# Patient Record
Sex: FEMALE | Race: WHITE | NOT HISPANIC OR LATINO | URBAN - METROPOLITAN AREA
[De-identification: names, ages, dates, MRNs, and addresses within clinical notes are randomized per-mention and may not be internally consistent; named-entity substitution may affect disease eponyms.]

---

## 2017-03-07 ENCOUNTER — OUTPATIENT (OUTPATIENT)
Dept: OUTPATIENT SERVICES | Facility: HOSPITAL | Age: 27
LOS: 1 days | End: 2017-03-07
Payer: COMMERCIAL

## 2017-03-07 ENCOUNTER — APPOINTMENT (OUTPATIENT)
Dept: CT IMAGING | Facility: IMAGING CENTER | Age: 27
End: 2017-03-07

## 2017-03-07 DIAGNOSIS — Z98.89 OTHER SPECIFIED POSTPROCEDURAL STATES: Chronic | ICD-10-CM

## 2017-03-07 DIAGNOSIS — Z00.8 ENCOUNTER FOR OTHER GENERAL EXAMINATION: ICD-10-CM

## 2017-03-07 PROCEDURE — 74177 CT ABD & PELVIS W/CONTRAST: CPT

## 2017-03-20 ENCOUNTER — APPOINTMENT (OUTPATIENT)
Dept: THORACIC SURGERY | Facility: CLINIC | Age: 27
End: 2017-03-20

## 2017-03-22 ENCOUNTER — APPOINTMENT (OUTPATIENT)
Dept: THORACIC SURGERY | Facility: CLINIC | Age: 27
End: 2017-03-22

## 2017-03-22 VITALS
DIASTOLIC BLOOD PRESSURE: 62 MMHG | HEART RATE: 79 BPM | WEIGHT: 124 LBS | OXYGEN SATURATION: 99 % | SYSTOLIC BLOOD PRESSURE: 100 MMHG | HEIGHT: 63 IN | BODY MASS INDEX: 21.97 KG/M2 | RESPIRATION RATE: 18 BRPM

## 2017-04-03 ENCOUNTER — APPOINTMENT (OUTPATIENT)
Dept: MRI IMAGING | Facility: CLINIC | Age: 27
End: 2017-04-03

## 2017-04-03 ENCOUNTER — OUTPATIENT (OUTPATIENT)
Dept: OUTPATIENT SERVICES | Facility: HOSPITAL | Age: 27
LOS: 1 days | End: 2017-04-03
Payer: COMMERCIAL

## 2017-04-03 DIAGNOSIS — Z00.8 ENCOUNTER FOR OTHER GENERAL EXAMINATION: ICD-10-CM

## 2017-04-03 DIAGNOSIS — M89.9 DISORDER OF BONE, UNSPECIFIED: ICD-10-CM

## 2017-04-03 DIAGNOSIS — Z98.89 OTHER SPECIFIED POSTPROCEDURAL STATES: Chronic | ICD-10-CM

## 2017-04-03 PROCEDURE — 71550 MRI CHEST W/O DYE: CPT

## 2017-04-06 ENCOUNTER — CLINICAL ADVICE (OUTPATIENT)
Age: 27
End: 2017-04-06

## 2017-04-27 ENCOUNTER — EMERGENCY (EMERGENCY)
Facility: HOSPITAL | Age: 27
LOS: 1 days | Discharge: HOME | End: 2017-04-27
Attending: EMERGENCY MEDICINE | Admitting: EMERGENCY MEDICINE
Payer: COMMERCIAL

## 2017-04-27 VITALS
SYSTOLIC BLOOD PRESSURE: 115 MMHG | HEART RATE: 85 BPM | TEMPERATURE: 99 F | DIASTOLIC BLOOD PRESSURE: 82 MMHG | RESPIRATION RATE: 16 BRPM | OXYGEN SATURATION: 100 %

## 2017-04-27 DIAGNOSIS — Z98.89 OTHER SPECIFIED POSTPROCEDURAL STATES: Chronic | ICD-10-CM

## 2017-04-27 PROCEDURE — 99284 EMERGENCY DEPT VISIT MOD MDM: CPT | Mod: 25

## 2017-04-27 PROCEDURE — 93010 ELECTROCARDIOGRAM REPORT: CPT

## 2017-04-27 NOTE — ED ADULT TRIAGE NOTE - CHIEF COMPLAINT QUOTE
Pt arrives w/ c/o chest pain accompanied w/ SOB and dry cough since Sunday. Respirations appear even and unlabored.

## 2017-04-28 VITALS
SYSTOLIC BLOOD PRESSURE: 111 MMHG | RESPIRATION RATE: 16 BRPM | HEART RATE: 74 BPM | TEMPERATURE: 99 F | DIASTOLIC BLOOD PRESSURE: 79 MMHG | OXYGEN SATURATION: 100 %

## 2017-04-28 RX ORDER — ALBUTEROL 90 UG/1
1 AEROSOL, METERED ORAL ONCE
Qty: 0 | Refills: 0 | Status: COMPLETED | OUTPATIENT
Start: 2017-04-28 | End: 2017-04-28

## 2017-04-28 RX ORDER — FLUTICASONE PROPIONATE 50 MCG
1 SPRAY, SUSPENSION NASAL
Qty: 1 | Refills: 0
Start: 2017-04-28 | End: 2017-05-13

## 2017-04-28 RX ORDER — ALBUTEROL 90 UG/1
2.5 AEROSOL, METERED ORAL ONCE
Qty: 0 | Refills: 0 | Status: COMPLETED | OUTPATIENT
Start: 2017-04-28 | End: 2017-04-28

## 2017-04-28 RX ADMIN — ALBUTEROL 1 PUFF(S): 90 AEROSOL, METERED ORAL at 03:38

## 2017-04-28 RX ADMIN — ALBUTEROL 2.5 MILLIGRAM(S): 90 AEROSOL, METERED ORAL at 03:16

## 2017-04-28 NOTE — ED PROVIDER NOTE - OBJECTIVE STATEMENT
26F no PMH p/w cough, nasal congestion, SOB x 5 days. Taking mucinex and ibuprofen without relief. + chills 2 days ago. Denies sick contacts, recent travel, fever, nausea, vomiting, diarrhea, dysuria, hematuria. LMP first week of april.

## 2017-04-28 NOTE — ED PROVIDER NOTE - PLAN OF CARE
- Follow up with your primary care doctor within 2-3 days.   - Take flonase 1 spray each nostril once a day. Use saline spray as needed.   - Use albuterol 2 puffs every 4-6 hours as needed for shortness of breath.   - Use tessalon perles every 6 hours as needed for cough.   - Return to the ED for new or worsening symptoms.

## 2017-04-28 NOTE — ED PROVIDER NOTE - CARE PLAN
Principal Discharge DX:	URI (upper respiratory infection)  Instructions for follow-up, activity and diet:	- Follow up with your primary care doctor within 2-3 days.   - Take flonase 1 spray each nostril once a day. Use saline spray as needed.   - Use albuterol 2 puffs every 4-6 hours as needed for shortness of breath.   - Use tessalon perles every 6 hours as needed for cough.   - Return to the ED for new or worsening symptoms.

## 2017-04-28 NOTE — ED PROVIDER NOTE - ATTENDING CONTRIBUTION TO CARE
AJM: 26F no PMH p/w cough, nasal congestion, SOB x 5 days. Taking mucinex and ibuprofen without relief. + chills 2 days ago. VSS. benign resp exam. No smoking. No resp distress. likely viral uri vs allergic. [facility]vs, flonase

## 2017-04-28 NOTE — ED ADULT NURSE NOTE - OBJECTIVE STATEMENT
RN Facilitator: Pt received into room 19 co CP, SOB and dry cough since Sunday, worsening today. Pt states CP is 8/10 describes pain as pressure, pain is worse when coughing, SOB is on exertion and worse with coughing. Pt A&Ox4, in NAD. Pt also co chills, generalized fatigue. Pt denies fevers, urinary symptoms, dizzy, light headed. Pt also states she has been smoking cigarettes increasingly lately; pt has been taking ibuprofen daily with no relief of pain. MD evaluated, Vs as noted. Call bell within reach, will continue to monitor for safety and comfort. Report endorsed to Primary RN Corinne.

## 2017-08-29 ENCOUNTER — RESULT REVIEW (OUTPATIENT)
Age: 27
End: 2017-08-29

## 2017-11-28 ENCOUNTER — APPOINTMENT (OUTPATIENT)
Dept: OTOLARYNGOLOGY | Facility: CLINIC | Age: 27
End: 2017-11-28
Payer: COMMERCIAL

## 2017-11-28 VITALS
HEART RATE: 81 BPM | DIASTOLIC BLOOD PRESSURE: 69 MMHG | HEIGHT: 63 IN | WEIGHT: 124 LBS | BODY MASS INDEX: 21.97 KG/M2 | SYSTOLIC BLOOD PRESSURE: 106 MMHG

## 2017-11-28 PROCEDURE — 31231 NASAL ENDOSCOPY DX: CPT

## 2017-11-28 PROCEDURE — 99214 OFFICE O/P EST MOD 30 MIN: CPT | Mod: 25

## 2018-02-16 ENCOUNTER — APPOINTMENT (OUTPATIENT)
Dept: UROLOGY | Facility: CLINIC | Age: 28
End: 2018-02-16
Payer: COMMERCIAL

## 2018-02-16 VITALS
RESPIRATION RATE: 16 BRPM | BODY MASS INDEX: 21.97 KG/M2 | WEIGHT: 124 LBS | HEART RATE: 75 BPM | HEIGHT: 63 IN | SYSTOLIC BLOOD PRESSURE: 90 MMHG | DIASTOLIC BLOOD PRESSURE: 63 MMHG

## 2018-02-16 DIAGNOSIS — Z84.1 FAMILY HISTORY OF DISORDERS OF KIDNEY AND URETER: ICD-10-CM

## 2018-02-16 DIAGNOSIS — Z87.19 PERSONAL HISTORY OF OTHER DISEASES OF THE DIGESTIVE SYSTEM: ICD-10-CM

## 2018-02-16 DIAGNOSIS — Z78.9 OTHER SPECIFIED HEALTH STATUS: ICD-10-CM

## 2018-02-16 DIAGNOSIS — Z86.19 PERSONAL HISTORY OF OTHER INFECTIOUS AND PARASITIC DISEASES: ICD-10-CM

## 2018-02-16 PROCEDURE — 51798 US URINE CAPACITY MEASURE: CPT

## 2018-02-16 PROCEDURE — 99205 OFFICE O/P NEW HI 60 MIN: CPT

## 2018-02-19 ENCOUNTER — TRANSCRIPTION ENCOUNTER (OUTPATIENT)
Age: 28
End: 2018-02-19

## 2018-02-23 LAB
A VAGINAE DNA VAG QL NAA+PROBE: NORMAL
APPEARANCE: CLEAR
BACTERIA UR CULT: ABNORMAL
BACTERIA: NEGATIVE
BILIRUBIN URINE: NEGATIVE
BLOOD URINE: NEGATIVE
BVAB2 DNA VAG QL NAA+PROBE: NORMAL
C KRUSEI DNA VAG QL NAA+PROBE: NEGATIVE
COLOR: YELLOW
GLUCOSE QUALITATIVE U: NEGATIVE MG/DL
HYALINE CASTS: 1 /LPF
KETONES URINE: NEGATIVE
LEUKOCYTE ESTERASE URINE: NEGATIVE
MEGA1 DNA VAG QL NAA+PROBE: NORMAL
MICROSCOPIC-UA: NORMAL
NITRITE URINE: NEGATIVE
PH URINE: 7
PROTEIN URINE: NEGATIVE MG/DL
RED BLOOD CELLS URINE: 1 /HPF
SPECIFIC GRAVITY URINE: 1.02
SQUAMOUS EPITHELIAL CELLS: 4 /HPF
T VAGINALIS RRNA SPEC QL NAA+PROBE: NEGATIVE
UROBILINOGEN URINE: NEGATIVE MG/DL
WHITE BLOOD CELLS URINE: 0 /HPF

## 2018-02-26 ENCOUNTER — MESSAGE (OUTPATIENT)
Age: 28
End: 2018-02-26

## 2018-04-11 ENCOUNTER — APPOINTMENT (OUTPATIENT)
Dept: UROLOGY | Facility: CLINIC | Age: 28
End: 2018-04-11
Payer: COMMERCIAL

## 2018-04-11 PROCEDURE — 99215 OFFICE O/P EST HI 40 MIN: CPT

## 2018-06-13 ENCOUNTER — APPOINTMENT (OUTPATIENT)
Dept: UROLOGY | Facility: CLINIC | Age: 28
End: 2018-06-13
Payer: COMMERCIAL

## 2018-06-13 PROCEDURE — 99214 OFFICE O/P EST MOD 30 MIN: CPT

## 2018-07-07 ENCOUNTER — MESSAGE (OUTPATIENT)
Age: 28
End: 2018-07-07

## 2018-07-09 LAB
A VAGINAE DNA VAG QL NAA+PROBE: NORMAL
BACTERIA UR CULT: ABNORMAL
BVAB2 DNA VAG QL NAA+PROBE: NORMAL
C KRUSEI DNA VAG QL NAA+PROBE: NEGATIVE
MEGA1 DNA VAG QL NAA+PROBE: NORMAL
T VAGINALIS RRNA SPEC QL NAA+PROBE: NEGATIVE

## 2018-09-05 ENCOUNTER — RESULT REVIEW (OUTPATIENT)
Age: 28
End: 2018-09-05

## 2018-09-12 ENCOUNTER — APPOINTMENT (OUTPATIENT)
Dept: UROLOGY | Facility: CLINIC | Age: 28
End: 2018-09-12

## 2018-09-17 ENCOUNTER — RESULT REVIEW (OUTPATIENT)
Age: 28
End: 2018-09-17

## 2018-10-24 ENCOUNTER — APPOINTMENT (OUTPATIENT)
Dept: UROLOGY | Facility: CLINIC | Age: 28
End: 2018-10-24
Payer: COMMERCIAL

## 2018-10-24 PROCEDURE — 99214 OFFICE O/P EST MOD 30 MIN: CPT

## 2018-10-29 ENCOUNTER — RESULT REVIEW (OUTPATIENT)
Age: 28
End: 2018-10-29

## 2018-10-29 LAB — BACTERIA UR CULT: ABNORMAL

## 2018-10-31 LAB
A VAGINAE DNA VAG QL NAA+PROBE: NORMAL
BVAB2 DNA VAG QL NAA+PROBE: NORMAL
C KRUSEI DNA VAG QL NAA+PROBE: NEGATIVE
MEGA1 DNA VAG QL NAA+PROBE: NORMAL
T VAGINALIS RRNA SPEC QL NAA+PROBE: NEGATIVE

## 2018-11-28 LAB
APPEARANCE: CLEAR
BACTERIA UR CULT: NORMAL
BACTERIA: NEGATIVE
BILIRUBIN URINE: NEGATIVE
BLOOD URINE: NEGATIVE
CALCIUM OXALATE CRYSTALS: ABNORMAL
COLOR: ABNORMAL
GLUCOSE QUALITATIVE U: NEGATIVE MG/DL
HYALINE CASTS: 2 /LPF
KETONES URINE: ABNORMAL
LEUKOCYTE ESTERASE URINE: NEGATIVE
MICROSCOPIC-UA: NORMAL
NITRITE URINE: NEGATIVE
PH URINE: 6
PROTEIN URINE: ABNORMAL MG/DL
RED BLOOD CELLS URINE: 2 /HPF
SPECIFIC GRAVITY URINE: 1.02
SQUAMOUS EPITHELIAL CELLS: 4 /HPF
URINE COMMENTS: NORMAL
UROBILINOGEN URINE: NEGATIVE MG/DL
WHITE BLOOD CELLS URINE: 3 /HPF

## 2018-12-19 ENCOUNTER — APPOINTMENT (OUTPATIENT)
Dept: UROLOGY | Facility: CLINIC | Age: 28
End: 2018-12-19

## 2019-03-11 ENCOUNTER — RESULT REVIEW (OUTPATIENT)
Age: 29
End: 2019-03-11

## 2019-03-27 ENCOUNTER — RESULT REVIEW (OUTPATIENT)
Age: 29
End: 2019-03-27

## 2019-04-26 ENCOUNTER — APPOINTMENT (OUTPATIENT)
Dept: UROLOGY | Facility: CLINIC | Age: 29
End: 2019-04-26
Payer: COMMERCIAL

## 2019-04-26 ENCOUNTER — APPOINTMENT (OUTPATIENT)
Dept: UROLOGY | Facility: CLINIC | Age: 29
End: 2019-04-26

## 2019-04-26 ENCOUNTER — TRANSCRIPTION ENCOUNTER (OUTPATIENT)
Age: 29
End: 2019-04-26

## 2019-04-26 VITALS
OXYGEN SATURATION: 97 % | BODY MASS INDEX: 21.97 KG/M2 | HEART RATE: 84 BPM | DIASTOLIC BLOOD PRESSURE: 70 MMHG | WEIGHT: 124 LBS | RESPIRATION RATE: 16 BRPM | SYSTOLIC BLOOD PRESSURE: 100 MMHG | HEIGHT: 63 IN

## 2019-04-26 DIAGNOSIS — Z87.440 PERSONAL HISTORY OF URINARY (TRACT) INFECTIONS: ICD-10-CM

## 2019-04-26 PROCEDURE — 87210 SMEAR WET MOUNT SALINE/INK: CPT | Mod: QW

## 2019-04-26 PROCEDURE — 99215 OFFICE O/P EST HI 40 MIN: CPT | Mod: 25

## 2019-04-26 PROCEDURE — 83986 ASSAY PH BODY FLUID NOS: CPT | Mod: QW

## 2019-04-26 RX ORDER — FLUCONAZOLE 150 MG/1
150 TABLET ORAL
Qty: 1 | Refills: 3 | Status: COMPLETED | COMMUNITY
Start: 2018-07-07 | End: 2019-04-26

## 2019-04-26 RX ORDER — DICYCLOMINE HYDROCHLORIDE 10 MG/1
10 CAPSULE ORAL
Qty: 30 | Refills: 0 | Status: COMPLETED | COMMUNITY
Start: 2017-03-09 | End: 2019-04-26

## 2019-04-26 RX ORDER — CLOTRIMAZOLE AND BETAMETHASONE DIPROPIONATE 10; .5 MG/G; MG/G
1-0.05 CREAM TOPICAL
Qty: 45 | Refills: 0 | Status: COMPLETED | COMMUNITY
Start: 2016-11-14 | End: 2019-04-26

## 2019-04-26 RX ORDER — DOXYCYCLINE HYCLATE 100 MG/1
100 CAPSULE ORAL
Qty: 20 | Refills: 0 | Status: COMPLETED | COMMUNITY
Start: 2017-02-24 | End: 2019-04-26

## 2019-04-26 RX ORDER — FLUCONAZOLE 150 MG/1
150 TABLET ORAL
Qty: 1 | Refills: 0 | Status: COMPLETED | COMMUNITY
Start: 2017-01-25 | End: 2019-04-26

## 2019-04-26 RX ORDER — FLUCONAZOLE 150 MG/1
150 TABLET ORAL
Qty: 1 | Refills: 2 | Status: COMPLETED | COMMUNITY
Start: 2018-05-31 | End: 2019-04-26

## 2019-04-26 RX ORDER — IBUPROFEN 600 MG/1
600 TABLET, FILM COATED ORAL 3 TIMES DAILY
Qty: 30 | Refills: 0 | Status: COMPLETED | COMMUNITY
Start: 2017-04-06 | End: 2019-04-26

## 2019-04-26 RX ORDER — NYSTATIN AND TRIAMCINOLONE ACETONIDE 100000; 1 [USP'U]/G; MG/G
100000-0.1 OINTMENT TOPICAL
Qty: 30 | Refills: 0 | Status: COMPLETED | COMMUNITY
Start: 2017-08-18 | End: 2019-04-26

## 2019-04-26 RX ORDER — METRONIDAZOLE 7.5 MG/G
0.75 GEL VAGINAL
Qty: 5 | Refills: 0 | Status: COMPLETED | COMMUNITY
Start: 2018-07-09 | End: 2019-04-26

## 2019-04-26 RX ORDER — AZITHROMYCIN DIHYDRATE 250 MG/1
250 TABLET, FILM COATED ORAL
Qty: 1 | Refills: 2 | Status: COMPLETED | COMMUNITY
Start: 2017-11-28 | End: 2019-04-26

## 2019-04-26 RX ORDER — SULFAMETHOXAZOLE AND TRIMETHOPRIM 800; 160 MG/1; MG/1
800-160 TABLET ORAL TWICE DAILY
Qty: 10 | Refills: 0 | Status: COMPLETED | COMMUNITY
Start: 2018-10-29 | End: 2019-04-26

## 2019-04-26 RX ORDER — CIPROFLOXACIN HYDROCHLORIDE 500 MG/1
500 TABLET, FILM COATED ORAL
Qty: 10 | Refills: 2 | Status: COMPLETED | COMMUNITY
Start: 2018-02-23 | End: 2019-04-26

## 2019-04-26 RX ORDER — TINIDAZOLE 500 MG/1
500 TABLET, FILM COATED ORAL
Qty: 8 | Refills: 0 | Status: COMPLETED | COMMUNITY
Start: 2017-11-07 | End: 2019-04-26

## 2019-04-26 RX ORDER — METRONIDAZOLE 500 MG/1
500 TABLET ORAL
Qty: 21 | Refills: 0 | Status: COMPLETED | COMMUNITY
Start: 2017-02-14 | End: 2019-04-26

## 2019-04-26 RX ORDER — BUTOCONAZOLE NITRATE 100 MG/5G
2 CREAM VAGINAL
Qty: 5 | Refills: 0 | Status: COMPLETED | COMMUNITY
Start: 2017-02-14 | End: 2019-04-26

## 2019-04-29 LAB
APPEARANCE: CLEAR
BACTERIA UR CULT: NORMAL
BACTERIA: NEGATIVE
BILIRUBIN URINE: NEGATIVE
BLOOD URINE: ABNORMAL
COLOR: NORMAL
GLUCOSE QUALITATIVE U: NEGATIVE
HYALINE CASTS: 0 /LPF
KETONES URINE: NORMAL
LEUKOCYTE ESTERASE URINE: NEGATIVE
MICROSCOPIC-UA: NORMAL
NITRITE URINE: NEGATIVE
PH URINE: 7
PROTEIN URINE: NEGATIVE
RED BLOOD CELLS URINE: 23 /HPF
SPECIFIC GRAVITY URINE: 1.02
SQUAMOUS EPITHELIAL CELLS: 4 /HPF
URINE COMMENTS: NORMAL
UROBILINOGEN URINE: NORMAL
WHITE BLOOD CELLS URINE: 2 /HPF

## 2019-05-01 ENCOUNTER — OUTPATIENT (OUTPATIENT)
Dept: OUTPATIENT SERVICES | Facility: HOSPITAL | Age: 29
LOS: 1 days | End: 2019-05-01
Payer: COMMERCIAL

## 2019-05-01 ENCOUNTER — APPOINTMENT (OUTPATIENT)
Dept: CT IMAGING | Facility: IMAGING CENTER | Age: 29
End: 2019-05-01
Payer: COMMERCIAL

## 2019-05-01 DIAGNOSIS — R31.29 OTHER MICROSCOPIC HEMATURIA: ICD-10-CM

## 2019-05-01 DIAGNOSIS — Z98.89 OTHER SPECIFIED POSTPROCEDURAL STATES: Chronic | ICD-10-CM

## 2019-05-01 PROCEDURE — 74178 CT ABD&PLV WO CNTR FLWD CNTR: CPT | Mod: 26

## 2019-05-01 PROCEDURE — 74178 CT ABD&PLV WO CNTR FLWD CNTR: CPT

## 2019-05-06 ENCOUNTER — APPOINTMENT (OUTPATIENT)
Dept: UROLOGY | Facility: CLINIC | Age: 29
End: 2019-05-06

## 2019-05-16 ENCOUNTER — EMERGENCY (EMERGENCY)
Facility: HOSPITAL | Age: 29
LOS: 1 days | Discharge: HOME | End: 2019-05-16
Attending: EMERGENCY MEDICINE | Admitting: EMERGENCY MEDICINE
Payer: COMMERCIAL

## 2019-05-16 VITALS
OXYGEN SATURATION: 100 % | HEART RATE: 80 BPM | TEMPERATURE: 100 F | RESPIRATION RATE: 16 BRPM | SYSTOLIC BLOOD PRESSURE: 100 MMHG | WEIGHT: 130.07 LBS | DIASTOLIC BLOOD PRESSURE: 57 MMHG | HEIGHT: 63 IN

## 2019-05-16 VITALS
TEMPERATURE: 99 F | DIASTOLIC BLOOD PRESSURE: 54 MMHG | RESPIRATION RATE: 16 BRPM | HEART RATE: 79 BPM | SYSTOLIC BLOOD PRESSURE: 112 MMHG | OXYGEN SATURATION: 100 %

## 2019-05-16 DIAGNOSIS — Z98.89 OTHER SPECIFIED POSTPROCEDURAL STATES: Chronic | ICD-10-CM

## 2019-05-16 LAB
ALBUMIN SERPL ELPH-MCNC: 4.6 G/DL — SIGNIFICANT CHANGE UP (ref 3.3–5)
ALP SERPL-CCNC: 71 U/L — SIGNIFICANT CHANGE UP (ref 40–120)
ALT FLD-CCNC: 10 U/L — SIGNIFICANT CHANGE UP (ref 4–33)
ANION GAP SERPL CALC-SCNC: 12 MMO/L — SIGNIFICANT CHANGE UP (ref 7–14)
APPEARANCE UR: CLEAR — SIGNIFICANT CHANGE UP
AST SERPL-CCNC: 14 U/L — SIGNIFICANT CHANGE UP (ref 4–32)
BACTERIA # UR AUTO: NEGATIVE — SIGNIFICANT CHANGE UP
BASOPHILS # BLD AUTO: 0.04 K/UL — SIGNIFICANT CHANGE UP (ref 0–0.2)
BASOPHILS NFR BLD AUTO: 0.6 % — SIGNIFICANT CHANGE UP (ref 0–2)
BILIRUB SERPL-MCNC: 0.5 MG/DL — SIGNIFICANT CHANGE UP (ref 0.2–1.2)
BILIRUB UR-MCNC: NEGATIVE — SIGNIFICANT CHANGE UP
BLOOD UR QL VISUAL: HIGH
BUN SERPL-MCNC: 16 MG/DL — SIGNIFICANT CHANGE UP (ref 7–23)
CALCIUM SERPL-MCNC: 9.3 MG/DL — SIGNIFICANT CHANGE UP (ref 8.4–10.5)
CHLORIDE SERPL-SCNC: 104 MMOL/L — SIGNIFICANT CHANGE UP (ref 98–107)
CO2 SERPL-SCNC: 25 MMOL/L — SIGNIFICANT CHANGE UP (ref 22–31)
COLOR SPEC: SIGNIFICANT CHANGE UP
CREAT SERPL-MCNC: 0.62 MG/DL — SIGNIFICANT CHANGE UP (ref 0.5–1.3)
EOSINOPHIL # BLD AUTO: 0.02 K/UL — SIGNIFICANT CHANGE UP (ref 0–0.5)
EOSINOPHIL NFR BLD AUTO: 0.3 % — SIGNIFICANT CHANGE UP (ref 0–6)
GLUCOSE SERPL-MCNC: 78 MG/DL — SIGNIFICANT CHANGE UP (ref 70–99)
GLUCOSE UR-MCNC: NEGATIVE — SIGNIFICANT CHANGE UP
HCT VFR BLD CALC: 36.3 % — SIGNIFICANT CHANGE UP (ref 34.5–45)
HGB BLD-MCNC: 11.7 G/DL — SIGNIFICANT CHANGE UP (ref 11.5–15.5)
HYALINE CASTS # UR AUTO: NEGATIVE — SIGNIFICANT CHANGE UP
IMM GRANULOCYTES NFR BLD AUTO: 0.3 % — SIGNIFICANT CHANGE UP (ref 0–1.5)
KETONES UR-MCNC: NEGATIVE — SIGNIFICANT CHANGE UP
LEUKOCYTE ESTERASE UR-ACNC: NEGATIVE — SIGNIFICANT CHANGE UP
LIDOCAIN IGE QN: 21.2 U/L — SIGNIFICANT CHANGE UP (ref 7–60)
LYMPHOCYTES # BLD AUTO: 1.85 K/UL — SIGNIFICANT CHANGE UP (ref 1–3.3)
LYMPHOCYTES # BLD AUTO: 26.8 % — SIGNIFICANT CHANGE UP (ref 13–44)
MCHC RBC-ENTMCNC: 29.2 PG — SIGNIFICANT CHANGE UP (ref 27–34)
MCHC RBC-ENTMCNC: 32.2 % — SIGNIFICANT CHANGE UP (ref 32–36)
MCV RBC AUTO: 90.5 FL — SIGNIFICANT CHANGE UP (ref 80–100)
MONOCYTES # BLD AUTO: 0.32 K/UL — SIGNIFICANT CHANGE UP (ref 0–0.9)
MONOCYTES NFR BLD AUTO: 4.6 % — SIGNIFICANT CHANGE UP (ref 2–14)
NEUTROPHILS # BLD AUTO: 4.65 K/UL — SIGNIFICANT CHANGE UP (ref 1.8–7.4)
NEUTROPHILS NFR BLD AUTO: 67.4 % — SIGNIFICANT CHANGE UP (ref 43–77)
NITRITE UR-MCNC: NEGATIVE — SIGNIFICANT CHANGE UP
NRBC # FLD: 0.02 K/UL — SIGNIFICANT CHANGE UP (ref 0–0)
PH UR: 6.5 — SIGNIFICANT CHANGE UP (ref 5–8)
PLATELET # BLD AUTO: 234 K/UL — SIGNIFICANT CHANGE UP (ref 150–400)
PMV BLD: 12.1 FL — SIGNIFICANT CHANGE UP (ref 7–13)
POTASSIUM SERPL-MCNC: 3.9 MMOL/L — SIGNIFICANT CHANGE UP (ref 3.5–5.3)
POTASSIUM SERPL-SCNC: 3.9 MMOL/L — SIGNIFICANT CHANGE UP (ref 3.5–5.3)
PROT SERPL-MCNC: 6.9 G/DL — SIGNIFICANT CHANGE UP (ref 6–8.3)
PROT UR-MCNC: NEGATIVE — SIGNIFICANT CHANGE UP
RBC # BLD: 4.01 M/UL — SIGNIFICANT CHANGE UP (ref 3.8–5.2)
RBC # FLD: 11.5 % — SIGNIFICANT CHANGE UP (ref 10.3–14.5)
RBC CASTS # UR COMP ASSIST: SIGNIFICANT CHANGE UP (ref 0–?)
SODIUM SERPL-SCNC: 141 MMOL/L — SIGNIFICANT CHANGE UP (ref 135–145)
SP GR SPEC: 1.01 — SIGNIFICANT CHANGE UP (ref 1–1.04)
SQUAMOUS # UR AUTO: SIGNIFICANT CHANGE UP
UROBILINOGEN FLD QL: NORMAL — SIGNIFICANT CHANGE UP
WBC # BLD: 6.9 K/UL — SIGNIFICANT CHANGE UP (ref 3.8–10.5)
WBC # FLD AUTO: 6.9 K/UL — SIGNIFICANT CHANGE UP (ref 3.8–10.5)
WBC UR QL: SIGNIFICANT CHANGE UP (ref 0–?)

## 2019-05-16 PROCEDURE — 99284 EMERGENCY DEPT VISIT MOD MDM: CPT

## 2019-05-16 PROCEDURE — 76830 TRANSVAGINAL US NON-OB: CPT | Mod: 26

## 2019-05-16 PROCEDURE — 76770 US EXAM ABDO BACK WALL COMP: CPT | Mod: 26

## 2019-05-16 RX ORDER — KETOROLAC TROMETHAMINE 30 MG/ML
15 SYRINGE (ML) INJECTION ONCE
Refills: 0 | Status: DISCONTINUED | OUTPATIENT
Start: 2019-05-16 | End: 2019-05-16

## 2019-05-16 RX ORDER — ONDANSETRON 8 MG/1
4 TABLET, FILM COATED ORAL ONCE
Refills: 0 | Status: COMPLETED | OUTPATIENT
Start: 2019-05-16 | End: 2019-05-16

## 2019-05-16 RX ORDER — SODIUM CHLORIDE 9 MG/ML
1000 INJECTION INTRAMUSCULAR; INTRAVENOUS; SUBCUTANEOUS ONCE
Refills: 0 | Status: COMPLETED | OUTPATIENT
Start: 2019-05-16 | End: 2019-05-16

## 2019-05-16 RX ADMIN — Medication 15 MILLIGRAM(S): at 18:37

## 2019-05-16 RX ADMIN — ONDANSETRON 4 MILLIGRAM(S): 8 TABLET, FILM COATED ORAL at 18:37

## 2019-05-16 RX ADMIN — SODIUM CHLORIDE 2000 MILLILITER(S): 9 INJECTION INTRAMUSCULAR; INTRAVENOUS; SUBCUTANEOUS at 18:37

## 2019-05-16 NOTE — ED PROVIDER NOTE - PROGRESS NOTE DETAILS
pt feels well, notes mild improvement w/ meds.  Offered further pain management, would prefer to go home.  Has rx leftover from prior kidney stone, does not need refills.  to f/u w/ uro tomorrow.  stable for NC.

## 2019-05-16 NOTE — ED PROVIDER NOTE - PHYSICAL EXAMINATION
***GEN - NAD; well appearing; A+O x3   ***PULMONARY - CTA b/l, symmetric breath sounds. ***CARDIAC -s1s2, RRR, no M,G,R  ***ABDOMEN - ND, NT, soft, no guarding, no rebound, no roula's   ***SKIN - no rash or bruising   ***NEUROLOGIC - alert and oriented, follows commands, sensation nl, motor nl, ***PSYCH - insight and judgment nl, memory nl, affect nl, thought nl    neg CVAt ***GEN - NAD; well appearing; A+O x3   ***PULMONARY - CTA b/l, symmetric breath sounds. ***CARDIAC -s1s2, RRR, no M,G,R  ***ABDOMEN - ND, NT, soft, no guarding, no rebound, no roula's   ***SKIN - no rash or bruising   ***NEUROLOGIC - alert and oriented, follows commands, sensation nl, motor nl, ***PSYCH - insight and judgment nl, memory nl, affect nl, thought nl    neg CVAt  no midline spinal TTP

## 2019-05-16 NOTE — ED ADULT TRIAGE NOTE - CHIEF COMPLAINT QUOTE
Left abdominal pain radiating to the left flank x 2 weeks, pt was here 2 weeks ago dx with a 3mm stone, pt passed stone last Friday but still c/o pain

## 2019-05-16 NOTE — ED PROVIDER NOTE - OBJECTIVE STATEMENT
29 y/o F w/ Hx renal stone pw L flank pain x 2 days.  recent dx of kidney stone outpatient w/ L flank pain, similar associated w/ urinary urgency.  Pt passed recent stone w/ resolution of symptoms.  L flank pain returned yesterday w/o urgency.  No AP, CP, SOB, fever, dysuria, vaginal pain/bleeding/discharge, d/c.  +nausea/-vomit. 29 y/o F w/ Hx renal stone pw L flank pain x 2 days.  Recent dx of kidney stone outpatient w/ L flank pain, persistent since onset - associated w/ urinary urgency and bladder pressure.  Pt passed recent stone w/ resolution of symptoms urgency and bladder pressure.  Has had persistent L flank pain x 3 wks, was intermittent, noted constant since yesterday.  No AP, CP, SOB, fever, dysuria, vaginal pain/bleeding/discharge, d/c, recent trauma, LE numbness/weakness, incontinence.  +nausea/-vomit.

## 2019-05-16 NOTE — ED PROVIDER NOTE - CLINICAL SUMMARY MEDICAL DECISION MAKING FREE TEXT BOX
pt w/ flank pain, consistent w/ prior renal stone.  Will check US, labs/UA.  Is well appearing. pt w/ flank pain, consistent w/ prior renal stone, persistent since kidney stone.  s/p recent CT scan 5/1 only showing renal stone.  Will check US, labs/UA.  Is well appearing.

## 2019-05-18 LAB
BACTERIA UR CULT: SIGNIFICANT CHANGE UP
SPECIMEN SOURCE: SIGNIFICANT CHANGE UP

## 2019-05-19 NOTE — ED POST DISCHARGE NOTE - RESULT SUMMARY
culture grew 3 or more types of organisms  which indicate collection contamination, consider recollection only if clinically indicated. Patient contact # [phone] discussed with patient ucx results. Discussed with patient need to return to ED if symptoms don't continue to improve or recur or develops any new or worsening symptoms that are of concern.

## 2019-05-29 ENCOUNTER — RX RENEWAL (OUTPATIENT)
Age: 29
End: 2019-05-29

## 2019-06-03 LAB
APPEARANCE: CLEAR
BACTERIA UR CULT: NORMAL
BACTERIA: NEGATIVE
BILIRUBIN URINE: NEGATIVE
BLOOD URINE: NEGATIVE
COLOR: YELLOW
GLUCOSE QUALITATIVE U: NEGATIVE
HYALINE CASTS: 2 /LPF
KETONES URINE: NEGATIVE
LEUKOCYTE ESTERASE URINE: NEGATIVE
MICROSCOPIC-UA: NORMAL
NITRITE URINE: NEGATIVE
PH URINE: 6
PROTEIN URINE: NORMAL
RED BLOOD CELLS URINE: 6 /HPF
SPECIFIC GRAVITY URINE: 1.03
SQUAMOUS EPITHELIAL CELLS: 5 /HPF
URINE COMMENTS: NORMAL
UROBILINOGEN URINE: NORMAL
WHITE BLOOD CELLS URINE: 3 /HPF

## 2019-06-07 ENCOUNTER — APPOINTMENT (OUTPATIENT)
Dept: UROLOGY | Facility: CLINIC | Age: 29
End: 2019-06-07
Payer: COMMERCIAL

## 2019-06-07 VITALS
RESPIRATION RATE: 13 BRPM | BODY MASS INDEX: 21.97 KG/M2 | HEART RATE: 88 BPM | WEIGHT: 124 LBS | OXYGEN SATURATION: 99 % | SYSTOLIC BLOOD PRESSURE: 108 MMHG | HEIGHT: 63 IN | DIASTOLIC BLOOD PRESSURE: 60 MMHG

## 2019-06-07 PROCEDURE — 52000 CYSTOURETHROSCOPY: CPT

## 2019-06-07 PROCEDURE — 99213 OFFICE O/P EST LOW 20 MIN: CPT | Mod: 25

## 2019-06-07 NOTE — HISTORY OF PRESENT ILLNESS
[FreeTextEntry1] : She is a 28-year-old woman who is seen today for microscopic hematuria and kidney stones. She is undergoing cystoscopy today. Recently she passed a stone. She has a strong family history of kidney stones. Urinalysis showed red blood cells and urine culture was negative. 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 She has the stone with her today. She still has lower back pain or discomfort. She also has previous history of herniated disc.

## 2019-06-07 NOTE — PHYSICAL EXAM
[General Appearance - Well Nourished] : well nourished [General Appearance - Well Developed] : well developed [Normal Appearance] : normal appearance [Abdomen Tenderness] : non-tender [Well Groomed] : well groomed [General Appearance - In No Acute Distress] : no acute distress [Abdomen Soft] : soft [Costovertebral Angle Tenderness] : no ~M costovertebral angle tenderness [FreeTextEntry1] : no prolapse [] : no respiratory distress [Respiration, Rhythm And Depth] : normal respiratory rhythm and effort [Exaggerated Use Of Accessory Muscles For Inspiration] : no accessory muscle use [Oriented To Time, Place, And Person] : oriented to person, place, and time [Mood] : the mood was normal [Affect] : the affect was normal [Not Anxious] : not anxious

## 2019-06-07 NOTE — ASSESSMENT
[FreeTextEntry1] : Cystoscopy was normal. We reviewed her CT scan images on the computer screen. Stone will be sent for analysis. Due to history of kidney stones, renal ultrasound should be done once a year. Also she will undergo 24-hour urine collection for metabolic workup. She could call me for the results. Stay hydrated. After metabolic workup, we could more accurately discuss dietary changes.

## 2019-06-11 LAB — COMPN STONE: NORMAL

## 2019-06-26 ENCOUNTER — APPOINTMENT (OUTPATIENT)
Dept: UROLOGY | Facility: CLINIC | Age: 29
End: 2019-06-26

## 2019-09-16 ENCOUNTER — RESULT REVIEW (OUTPATIENT)
Age: 29
End: 2019-09-16

## 2020-07-23 ENCOUNTER — APPOINTMENT (OUTPATIENT)
Dept: UROLOGY | Facility: CLINIC | Age: 30
End: 2020-07-23
Payer: COMMERCIAL

## 2020-07-23 VITALS — DIASTOLIC BLOOD PRESSURE: 70 MMHG | TEMPERATURE: 98.2 F | SYSTOLIC BLOOD PRESSURE: 108 MMHG

## 2020-07-23 DIAGNOSIS — N76.0 ACUTE VAGINITIS: ICD-10-CM

## 2020-07-23 DIAGNOSIS — B96.89 ACUTE VAGINITIS: ICD-10-CM

## 2020-07-23 PROCEDURE — 87210 SMEAR WET MOUNT SALINE/INK: CPT | Mod: QW

## 2020-07-23 PROCEDURE — 83986 ASSAY PH BODY FLUID NOS: CPT | Mod: QW

## 2020-07-23 PROCEDURE — 99214 OFFICE O/P EST MOD 30 MIN: CPT | Mod: 25

## 2020-07-23 RX ORDER — FLUCONAZOLE 200 MG/1
200 TABLET ORAL
Qty: 3 | Refills: 0 | Status: COMPLETED | COMMUNITY
Start: 2019-04-26 | End: 2020-07-23

## 2020-07-23 RX ORDER — CLINDAMYCIN PHOSPHATE 1 G/10ML
1 GEL TOPICAL TWICE DAILY
Qty: 1 | Refills: 2 | Status: COMPLETED | COMMUNITY
Start: 2018-04-11 | End: 2020-07-23

## 2020-07-23 RX ORDER — CLINDAMYCIN PHOSPHATE 100 MG/5G
2 CREAM VAGINAL DAILY
Qty: 1 | Refills: 0 | Status: COMPLETED | COMMUNITY
Start: 2019-04-26 | End: 2020-07-23

## 2020-08-25 RX ORDER — SECNIDAZOLE 2 G/4.8G
2 GRANULE ORAL
Qty: 1 | Refills: 0 | Status: COMPLETED | COMMUNITY
Start: 2020-07-23 | End: 2020-08-25

## 2020-10-02 ENCOUNTER — RESULT REVIEW (OUTPATIENT)
Age: 30
End: 2020-10-02

## 2020-12-16 PROBLEM — Z86.19 HISTORY OF CANDIDIASIS OF VAGINA: Status: RESOLVED | Noted: 2018-02-16 | Resolved: 2020-12-16

## 2020-12-21 PROBLEM — Z87.440 HISTORY OF URINARY TRACT INFECTION: Status: RESOLVED | Noted: 2018-02-23 | Resolved: 2020-12-21

## 2020-12-23 PROBLEM — N76.0 BACTERIAL VAGINOSIS: Status: RESOLVED | Noted: 2019-04-26 | Resolved: 2020-12-23

## 2020-12-29 NOTE — ED ADULT TRIAGE NOTE - TEMPERATURE IN CELSIUS (DEGREES C)
Case Management    PC to patient's mother, Ellyn [phone] - writer scheduled follow-up discharge meeting for tomorrow (12/30) at 12PM. Writer informed mother of change in medication appt to next week on Wednesday (1/6) at 11AM. Mother provided verbal consent for referral to [facility] for individual therapy. Mother is okay with CPS SW, Meka being a part of the meeting tomorrow.     PC to [facility] CPS, Meka [phone] - writer informed her of meeting tomorrow (12/30) at 12PM. SW confirmed that she is available at this time. Writer provided update in terms of discharge planning - med appt, individual therapy, and [facility]. SW noted that CPS and [facility] receive the same referrals, therefore she has been in touch with the worker who will be reaching out to mom regarding [facility]. Writer confirmed with CPS that they do not have any safety concerns in regards to patient returning to the home and that patient can do so at any time.     CLINT left for Zabrina, therapist at [facility] [phone] - writer requested return phone call to schedule therapy appointment for new client. Writer will fax DHEERAJ to [phone].   37.5

## 2021-08-23 ENCOUNTER — TRANSCRIPTION ENCOUNTER (OUTPATIENT)
Age: 31
End: 2021-08-23

## 2021-10-20 ENCOUNTER — RESULT REVIEW (OUTPATIENT)
Age: 31
End: 2021-10-20

## 2022-02-22 ENCOUNTER — APPOINTMENT (OUTPATIENT)
Dept: ANTEPARTUM | Facility: CLINIC | Age: 32
End: 2022-02-22
Payer: COMMERCIAL

## 2022-02-22 ENCOUNTER — ASOB RESULT (OUTPATIENT)
Age: 32
End: 2022-02-22

## 2022-02-22 PROCEDURE — 76805 OB US >/= 14 WKS SNGL FETUS: CPT

## 2022-04-08 ENCOUNTER — OUTPATIENT (OUTPATIENT)
Dept: OUTPATIENT SERVICES | Facility: HOSPITAL | Age: 32
LOS: 1 days | End: 2022-04-08
Payer: COMMERCIAL

## 2022-04-08 VITALS — HEART RATE: 81 BPM | OXYGEN SATURATION: 96 %

## 2022-04-08 VITALS — DIASTOLIC BLOOD PRESSURE: 63 MMHG | SYSTOLIC BLOOD PRESSURE: 111 MMHG | HEART RATE: 78 BPM | TEMPERATURE: 99 F

## 2022-04-08 DIAGNOSIS — O26.899 OTHER SPECIFIED PREGNANCY RELATED CONDITIONS, UNSPECIFIED TRIMESTER: ICD-10-CM

## 2022-04-08 DIAGNOSIS — Z3A.00 WEEKS OF GESTATION OF PREGNANCY NOT SPECIFIED: ICD-10-CM

## 2022-04-08 DIAGNOSIS — Z98.89 OTHER SPECIFIED POSTPROCEDURAL STATES: Chronic | ICD-10-CM

## 2022-04-08 LAB
APTT BLD: 27.6 SEC — SIGNIFICANT CHANGE UP (ref 27.5–35.5)
BLD GP AB SCN SERPL QL: NEGATIVE — SIGNIFICANT CHANGE UP
HCT VFR BLD CALC: 29.3 % — LOW (ref 34.5–45)
HGB BLD-MCNC: 9.6 G/DL — LOW (ref 11.5–15.5)
INR BLD: 0.99 RATIO — SIGNIFICANT CHANGE UP (ref 0.88–1.16)
MCHC RBC-ENTMCNC: 29.4 PG — SIGNIFICANT CHANGE UP (ref 27–34)
MCHC RBC-ENTMCNC: 32.8 GM/DL — SIGNIFICANT CHANGE UP (ref 32–36)
MCV RBC AUTO: 89.6 FL — SIGNIFICANT CHANGE UP (ref 80–100)
NRBC # BLD: 0 /100 WBCS — SIGNIFICANT CHANGE UP (ref 0–0)
PLATELET # BLD AUTO: 193 K/UL — SIGNIFICANT CHANGE UP (ref 150–400)
PROTHROM AB SERPL-ACNC: 11.4 SEC — SIGNIFICANT CHANGE UP (ref 10.5–13.4)
RBC # BLD: 3.27 M/UL — LOW (ref 3.8–5.2)
RBC # FLD: 11.9 % — SIGNIFICANT CHANGE UP (ref 10.3–14.5)
RH IG SCN BLD-IMP: POSITIVE — SIGNIFICANT CHANGE UP
WBC # BLD: 9.7 K/UL — SIGNIFICANT CHANGE UP (ref 3.8–10.5)
WBC # FLD AUTO: 9.7 K/UL — SIGNIFICANT CHANGE UP (ref 3.8–10.5)

## 2022-04-08 PROCEDURE — 86901 BLOOD TYPING SEROLOGIC RH(D): CPT

## 2022-04-08 PROCEDURE — 86850 RBC ANTIBODY SCREEN: CPT

## 2022-04-08 PROCEDURE — 85610 PROTHROMBIN TIME: CPT

## 2022-04-08 PROCEDURE — 59025 FETAL NON-STRESS TEST: CPT

## 2022-04-08 PROCEDURE — 86900 BLOOD TYPING SEROLOGIC ABO: CPT

## 2022-04-08 PROCEDURE — G0463: CPT

## 2022-04-08 PROCEDURE — 85027 COMPLETE CBC AUTOMATED: CPT

## 2022-04-08 PROCEDURE — 85730 THROMBOPLASTIN TIME PARTIAL: CPT

## 2022-04-08 NOTE — OB RN TRIAGE NOTE - NSICDXFAMILYHX_GEN_ALL_CORE_FT
FAMILY HISTORY:  Father  Still living? Yes, Estimated age: 51-60  Family history of hypertension in father, Age at diagnosis: Age Unknown

## 2022-04-08 NOTE — OB PROVIDER TRIAGE NOTE - NSOBPROVIDERNOTE_OBGYN_ALL_OB_FT
30 y/o  @ 26w4d s/p Fall on back/buttock     - will send CBC/Coags/T&S  - Prolonged fetal monitoring for 4 hours  - Monitor vitals    Plan of care d/w Dr. Olive Davis PA-C 30 y/o  @ 26w4d s/p Fall on back/buttock     - will send CBC/Coags/T&S  - Prolonged fetal monitoring for 4 hours  - Monitor vitals    Plan of care d/w Dr. Olive ISBELLC      R4 Chart Note    Tracing reactive  Patient without OB complanits  BSS performed: transverse, anterior, NATASHA 15, BPP 8/8  Return precautions discussed  Stable for discharge home    EAGLE Waters PGY4  d/w Dr. Schaefer

## 2022-04-08 NOTE — OB PROVIDER TRIAGE NOTE - NSHPPHYSICALEXAM_GEN_ALL_CORE
Gen: NAD, A&O x 4  Head: atraumatic   Neck: FROM, no cervical tenderness  Pulm: nonlabor breathing   Heart: RRR  Abd: soft, gravid, NT  Back: no spinous tenderness. no abrasion/erythema/induration  Ext: 0.5 cm x 1 cm abrasion noted at right elbow.         All four extremities with FROM, no erythema or edema

## 2022-07-08 ENCOUNTER — OUTPATIENT (OUTPATIENT)
Dept: OUTPATIENT SERVICES | Facility: HOSPITAL | Age: 32
LOS: 1 days | End: 2022-07-08
Payer: COMMERCIAL

## 2022-07-08 ENCOUNTER — TRANSCRIPTION ENCOUNTER (OUTPATIENT)
Age: 32
End: 2022-07-08

## 2022-07-08 ENCOUNTER — INPATIENT (INPATIENT)
Facility: HOSPITAL | Age: 32
LOS: 2 days | Discharge: HOME | End: 2022-07-11
Attending: OBSTETRICS & GYNECOLOGY | Admitting: OBSTETRICS & GYNECOLOGY
Payer: COMMERCIAL

## 2022-07-08 VITALS
SYSTOLIC BLOOD PRESSURE: 132 MMHG | DIASTOLIC BLOOD PRESSURE: 69 MMHG | HEART RATE: 81 BPM | RESPIRATION RATE: 18 BRPM | TEMPERATURE: 98 F

## 2022-07-08 VITALS
SYSTOLIC BLOOD PRESSURE: 134 MMHG | OXYGEN SATURATION: 97 % | RESPIRATION RATE: 18 BRPM | DIASTOLIC BLOOD PRESSURE: 71 MMHG | HEART RATE: 95 BPM | TEMPERATURE: 98 F

## 2022-07-08 VITALS
RESPIRATION RATE: 20 BRPM | SYSTOLIC BLOOD PRESSURE: 138 MMHG | HEART RATE: 89 BPM | DIASTOLIC BLOOD PRESSURE: 92 MMHG | TEMPERATURE: 98 F

## 2022-07-08 DIAGNOSIS — O26.899 OTHER SPECIFIED PREGNANCY RELATED CONDITIONS, UNSPECIFIED TRIMESTER: ICD-10-CM

## 2022-07-08 DIAGNOSIS — Z34.80 ENCOUNTER FOR SUPERVISION OF OTHER NORMAL PREGNANCY, UNSPECIFIED TRIMESTER: ICD-10-CM

## 2022-07-08 DIAGNOSIS — Z98.89 OTHER SPECIFIED POSTPROCEDURAL STATES: Chronic | ICD-10-CM

## 2022-07-08 DIAGNOSIS — Z3A.00 WEEKS OF GESTATION OF PREGNANCY NOT SPECIFIED: ICD-10-CM

## 2022-07-08 LAB
ALBUMIN SERPL ELPH-MCNC: 3.6 G/DL — SIGNIFICANT CHANGE UP (ref 3.3–5)
ALP SERPL-CCNC: 151 U/L — HIGH (ref 40–120)
ALT FLD-CCNC: 15 U/L — SIGNIFICANT CHANGE UP (ref 10–45)
ANION GAP SERPL CALC-SCNC: 14 MMOL/L — SIGNIFICANT CHANGE UP (ref 5–17)
APPEARANCE UR: ABNORMAL
APTT BLD: 27.3 SEC — LOW (ref 27.5–35.5)
AST SERPL-CCNC: 21 U/L — SIGNIFICANT CHANGE UP (ref 10–40)
BACTERIA # UR AUTO: NEGATIVE — SIGNIFICANT CHANGE UP
BASOPHILS # BLD AUTO: 0.01 K/UL — SIGNIFICANT CHANGE UP (ref 0–0.2)
BASOPHILS NFR BLD AUTO: 0.1 % — SIGNIFICANT CHANGE UP (ref 0–2)
BILIRUB SERPL-MCNC: 0.1 MG/DL — LOW (ref 0.2–1.2)
BILIRUB UR-MCNC: NEGATIVE — SIGNIFICANT CHANGE UP
BUN SERPL-MCNC: 10 MG/DL — SIGNIFICANT CHANGE UP (ref 7–23)
CALCIUM SERPL-MCNC: 9.4 MG/DL — SIGNIFICANT CHANGE UP (ref 8.4–10.5)
CHLORIDE SERPL-SCNC: 104 MMOL/L — SIGNIFICANT CHANGE UP (ref 96–108)
CO2 SERPL-SCNC: 20 MMOL/L — LOW (ref 22–31)
COLOR SPEC: YELLOW — SIGNIFICANT CHANGE UP
CREAT ?TM UR-MCNC: 50 MG/DL — SIGNIFICANT CHANGE UP
CREAT SERPL-MCNC: 0.51 MG/DL — SIGNIFICANT CHANGE UP (ref 0.5–1.3)
DIFF PNL FLD: ABNORMAL
EGFR: 126 ML/MIN/1.73M2 — SIGNIFICANT CHANGE UP
EOSINOPHIL # BLD AUTO: 0.06 K/UL — SIGNIFICANT CHANGE UP (ref 0–0.5)
EOSINOPHIL NFR BLD AUTO: 0.6 % — SIGNIFICANT CHANGE UP (ref 0–6)
EPI CELLS # UR: 3 /HPF — SIGNIFICANT CHANGE UP
FIBRINOGEN PPP-MCNC: 634 MG/DL — HIGH (ref 330–520)
GLUCOSE SERPL-MCNC: 81 MG/DL — SIGNIFICANT CHANGE UP (ref 70–99)
GLUCOSE UR QL: NEGATIVE — SIGNIFICANT CHANGE UP
HCT VFR BLD CALC: 31.4 % — LOW (ref 34.5–45)
HGB BLD-MCNC: 10.4 G/DL — LOW (ref 11.5–15.5)
HIV 1 & 2 AB SERPL IA.RAPID: SIGNIFICANT CHANGE UP
HYALINE CASTS # UR AUTO: 2 /LPF — SIGNIFICANT CHANGE UP (ref 0–2)
IMM GRANULOCYTES NFR BLD AUTO: 1.7 % — HIGH (ref 0–1.5)
INR BLD: 0.92 RATIO — SIGNIFICANT CHANGE UP (ref 0.88–1.16)
KETONES UR-MCNC: SIGNIFICANT CHANGE UP
LDH SERPL L TO P-CCNC: 149 U/L — SIGNIFICANT CHANGE UP (ref 50–242)
LEUKOCYTE ESTERASE UR-ACNC: ABNORMAL
LYMPHOCYTES # BLD AUTO: 1.67 K/UL — SIGNIFICANT CHANGE UP (ref 1–3.3)
LYMPHOCYTES # BLD AUTO: 15.5 % — SIGNIFICANT CHANGE UP (ref 13–44)
MCHC RBC-ENTMCNC: 29.3 PG — SIGNIFICANT CHANGE UP (ref 27–34)
MCHC RBC-ENTMCNC: 33.1 GM/DL — SIGNIFICANT CHANGE UP (ref 32–36)
MCV RBC AUTO: 88.5 FL — SIGNIFICANT CHANGE UP (ref 80–100)
MONOCYTES # BLD AUTO: 0.7 K/UL — SIGNIFICANT CHANGE UP (ref 0–0.9)
MONOCYTES NFR BLD AUTO: 6.5 % — SIGNIFICANT CHANGE UP (ref 2–14)
NEUTROPHILS # BLD AUTO: 8.14 K/UL — HIGH (ref 1.8–7.4)
NEUTROPHILS NFR BLD AUTO: 75.6 % — SIGNIFICANT CHANGE UP (ref 43–77)
NITRITE UR-MCNC: NEGATIVE — SIGNIFICANT CHANGE UP
NRBC # BLD: 0 /100 WBCS — SIGNIFICANT CHANGE UP (ref 0–0)
PH UR: 7 — SIGNIFICANT CHANGE UP (ref 5–8)
PLATELET # BLD AUTO: 204 K/UL — SIGNIFICANT CHANGE UP (ref 150–400)
POTASSIUM SERPL-MCNC: 3.3 MMOL/L — LOW (ref 3.5–5.3)
POTASSIUM SERPL-SCNC: 3.3 MMOL/L — LOW (ref 3.5–5.3)
PROT ?TM UR-MCNC: 10 MG/DL — SIGNIFICANT CHANGE UP (ref 0–12)
PROT SERPL-MCNC: 6.2 G/DL — SIGNIFICANT CHANGE UP (ref 6–8.3)
PROT UR-MCNC: NEGATIVE — SIGNIFICANT CHANGE UP
PROT/CREAT UR-RTO: 0.2 RATIO — SIGNIFICANT CHANGE UP (ref 0–0.2)
PROTHROM AB SERPL-ACNC: 10.6 SEC — SIGNIFICANT CHANGE UP (ref 10.5–13.4)
RBC # BLD: 3.55 M/UL — LOW (ref 3.8–5.2)
RBC # FLD: 12.6 % — SIGNIFICANT CHANGE UP (ref 10.3–14.5)
RBC CASTS # UR COMP ASSIST: 128 /HPF — HIGH (ref 0–4)
SODIUM SERPL-SCNC: 138 MMOL/L — SIGNIFICANT CHANGE UP (ref 135–145)
SP GR SPEC: 1.01 — SIGNIFICANT CHANGE UP (ref 1.01–1.02)
URATE SERPL-MCNC: 4 MG/DL — SIGNIFICANT CHANGE UP (ref 2.5–7)
UROBILINOGEN FLD QL: NEGATIVE — SIGNIFICANT CHANGE UP
WBC # BLD: 10.76 K/UL — HIGH (ref 3.8–10.5)
WBC # FLD AUTO: 10.76 K/UL — HIGH (ref 3.8–10.5)
WBC UR QL: 8 /HPF — HIGH (ref 0–5)

## 2022-07-08 PROCEDURE — G0463: CPT

## 2022-07-08 PROCEDURE — 59025 FETAL NON-STRESS TEST: CPT

## 2022-07-08 RX ORDER — AMPICILLIN TRIHYDRATE 250 MG
2 CAPSULE ORAL ONCE
Refills: 0 | Status: COMPLETED | OUTPATIENT
Start: 2022-07-08 | End: 2022-07-08

## 2022-07-08 RX ORDER — CITRIC ACID/SODIUM CITRATE 300-500 MG
15 SOLUTION, ORAL ORAL EVERY 6 HOURS
Refills: 0 | Status: DISCONTINUED | OUTPATIENT
Start: 2022-07-08 | End: 2022-07-09

## 2022-07-08 RX ORDER — CHLORHEXIDINE GLUCONATE 213 G/1000ML
1 SOLUTION TOPICAL ONCE
Refills: 0 | Status: DISCONTINUED | OUTPATIENT
Start: 2022-07-08 | End: 2022-07-09

## 2022-07-08 RX ORDER — OXYTOCIN 10 UNIT/ML
333.33 VIAL (ML) INJECTION
Qty: 20 | Refills: 0 | Status: DISCONTINUED | OUTPATIENT
Start: 2022-07-08 | End: 2022-07-09

## 2022-07-08 RX ORDER — AMPICILLIN TRIHYDRATE 250 MG
1 CAPSULE ORAL EVERY 4 HOURS
Refills: 0 | Status: DISCONTINUED | OUTPATIENT
Start: 2022-07-08 | End: 2022-07-09

## 2022-07-08 RX ORDER — SODIUM CHLORIDE 9 MG/ML
1000 INJECTION, SOLUTION INTRAVENOUS
Refills: 0 | Status: DISCONTINUED | OUTPATIENT
Start: 2022-07-08 | End: 2022-07-09

## 2022-07-08 RX ADMIN — Medication 200 GRAM(S): at 21:35

## 2022-07-08 RX ADMIN — SODIUM CHLORIDE 125 MILLILITER(S): 9 INJECTION, SOLUTION INTRAVENOUS at 20:35

## 2022-07-08 NOTE — OB PROVIDER TRIAGE NOTE - NS_OBGYNHISTORY_OBGYN_ALL_OB_FT
ObHx: primigravida    GynHx: h/o abnormal pap 2017 s/p colposcopy (wnl); denies fibroids, cysts, STIs

## 2022-07-08 NOTE — OB PROVIDER H&P - NSHPPHYSICALEXAM_GEN_ALL_CORE
Objective  – Vital Signs  BP: 138/92  HR: 78     – PE:   CV: RRR  Pulm: breathing comfortably on RA  Abd: gravid, nontender  Extr: moving all extremities with ease  – VE: 4/50/-3  – FHT: baseline 160, mod variability, +accels, +decels  – [place]: 4qmin  – EFW: 3600g by sono  – Sono: vertex

## 2022-07-08 NOTE — OB RN TRIAGE NOTE - SUICIDE SCREENING QUESTION 2
[facility]        NAME: Delmy Adam is a 56 y o  female  : 1958    MRN: [id number]  DATE: 2019  TIME: 5:11 PM    Assessment and Plan   Upper back pain [M54 9]  1  Upper back pain  methocarbamol (ROBAXIN) 500 mg tablet    naproxen (NAPROSYN) 500 mg tablet         Patient Instructions     Upper back pain  Robaxin - (may become drowsy)  Naprosyn twice daily as needed  Follow up with PCP in 3-5 days  Proceed to  ER if symptoms worsen  Chief Complaint     Chief Complaint   Patient presents with    Back Pain         History of Present Illness       57 y/o female presents c/o upper back pain x 10 days  States she was leaning doing her taxes and after staying in that position for a while she developed the pain  Denies h/o trauma, fever, chills, cough, abdominal pain, chest pain, SOB      Review of Systems   Review of Systems   Constitutional: Negative  HENT: Negative  Eyes: Negative  Respiratory: Negative  Negative for cough, chest tightness, shortness of breath, wheezing and stridor  Cardiovascular: Negative  Negative for chest pain, palpitations and leg swelling  Musculoskeletal: Positive for back pain           Current Medications       Current Outpatient Medications:     Ascorbic Acid (VITAMIN C) 500 MG CAPS, Take by mouth, Disp: , Rfl:     B Complex Vitamins (B COMPLEX 50 PO), Take by mouth, Disp: , Rfl:     Cholecalciferol (VITAMIN D3) 1000 units CAPS, Take by mouth, Disp: , Rfl:     diphenhydrAMINE (BENADRYL) 25 mg capsule, Take by mouth, Disp: , Rfl:     Lactobacillus (ACIDOPHILUS) 100 MG CAPS, Take by mouth, Disp: , Rfl:     naproxen sodium (ANAPROX) 550 mg tablet, Take by mouth, Disp: , Rfl:     Turmeric 500 MG TABS, Take by mouth, Disp: , Rfl:     methocarbamol (ROBAXIN) 500 mg tablet, Take 1 tablet (500 mg total) by mouth 4 (four) times a day for 5 days, Disp: 20 tablet, Rfl: 0    naproxen (NAPROSYN) 500 mg tablet, Take 1 tablet (500 mg total) by mouth 2 (two) times a day with meals for 10 days, Disp: 20 tablet, Rfl: 0    Current Allergies     Allergies as of 2019 - Reviewed 2019   Allergen Reaction Noted    Penicillins Hives 09/15/2015            The following portions of the patient's history were reviewed and updated as appropriate: allergies, current medications, past family history, past medical history, past social history, past surgical history and problem list      Past Medical History:   Diagnosis Date    Candidal vulvovaginitis     Cervical polyp     Herpes simplex     Herpes simplex infection     Mild dysplasia of cervix ([place] I)     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    Normal delivery     1985 daughter    Postmenopausal bleeding 2017    Rotator cuff tear, right     SVT (supraventricular tachycardia) ([address] )     Thickened endometrium 2017    UTI (urinary tract infection)     Vaginal discharge 2017       Past Surgical History:   Procedure Laterality Date    APPENDECTOMY      CERVICAL BIOPSY  W/ LOOP ELECTRODE EXCISION       SECTION       son    COLONOSCOPY      CYSTOSCOPY      DILATION AND CURETTAGE OF UTERUS      ENDOMETRIAL ABLATION W/ NOVASURE      ENDOMETRIAL BIOPSY      WITHOUT CERVICAL DILATION    HYSTEROSCOPY      OOPHORECTOMY Right     OVARIAN CYST REMOVAL      ROTATOR CUFF REPAIR  2007    SALPINGOOPHORECTOMY Right 1976    WITH APPENDECTOMY    TONSILLECTOMY         Family History   Problem Relation Age of Onset    Anemia Mother     Hypertension Mother     Hypertension Father     Prostate cancer Father     Anemia Sister     Thyroid cancer Sister     Coronary artery disease Maternal Grandmother     Lung cancer Maternal Grandfather          Medications have been verified          Objective   /80   Pulse 77   Temp 97 7 °F (36 5 °C) (Temporal)   Resp 20   Ht 5' 4" (1 626 m)   Wt 91 4 kg (201 lb 6 4 oz)   SpO2 100%   BMI 34 57 kg/m²        Physical Exam     Physical Exam   Constitutional: She appears well-developed and well-nourished  HENT:   Head: Normocephalic and atraumatic  Neck: Normal range of motion  Neck supple  Cardiovascular: Normal rate, regular rhythm, normal heart sounds and intact distal pulses  Pulmonary/Chest: Effort normal and breath sounds normal  No respiratory distress  She has no wheezes  She has no rales  She exhibits no tenderness  Abdominal: Soft  Bowel sounds are normal  She exhibits no distension and no mass  There is no tenderness  There is no rebound and no guarding  Musculoskeletal:        Thoracic back: She exhibits decreased range of motion, tenderness, pain and spasm  She exhibits no bony tenderness, no swelling, no edema, no deformity, no laceration and normal pulse  Arms:  Lymphadenopathy:     She has no cervical adenopathy  No

## 2022-07-08 NOTE — OB PROVIDER TRIAGE NOTE - HISTORY OF PRESENT ILLNESS
31 year old  at 39.4 weeks presents with abdominal tightening, lower back pain and pink discharge on toilet paper when wiping. -LOF, +FM. Uncomplicated pregnancy. +GBS.    ObHx: primigravida  MedHx: denies  SurgHx: rhinoplasty   GynHx: h/o abnormal pap 2017 s/p colposcopy (wnl); denies fibroids, cysts, STIs  SocialHx: denies ETOH, tobacco, drug use  PyschHx: denies anxiety, depression  All: NKDA, NKEA, NKFA  Meds: PNV    Vital Signs Last 24 Hrs  T(C): 36.8 (2022 14:35), Max: 36.8 (2022 14:06)  T(F): 98.2 (2022 14:35), Max: 98.24 (2022 14:06)  HR: 94 (2022 14:54) (86 - 99)  BP: 134/71 (2022 14:35) (134/71 - 134/71)  BP(mean): --  RR: 18 (2022 14:35) (18 - 18)  SpO2: 94% (2022 14:54) (94% - 97%)    PE: NAD, AOX3, abdomen soft gravid  VE: 3.5/70/-3  EFM: 135, moderate variability, +accels, -decels  [place]: q8min  EFW: 3800 grams

## 2022-07-08 NOTE — OB RN TRIAGE NOTE - FALL HARM RISK - UNIVERSAL INTERVENTIONS
Bed in lowest position, wheels locked, appropriate side rails in place/Call bell, personal items and telephone in reach/Instruct patient to call for assistance before getting out of bed or chair/Non-slip footwear when patient is out of bed/Purposeful Proactive Rounding/Room/bathroom lighting operational, light cord in reach

## 2022-07-08 NOTE — OB PROVIDER H&P - ATTENDING COMMENTS
nullip at 39.4 in labor, uncomfortable, requesting epidural. GBS + for amp. NKDA, otherwise benign prenatal course. for epidural, will reexamine and augment PRN. pt covid + day 9 today.

## 2022-07-08 NOTE — OB RN TRIAGE NOTE - FALL HARM RISK - UNIVERSAL INTERVENTIONS
Bed in lowest position, wheels locked, appropriate side rails in place/Call bell, personal items and telephone in reach/Instruct patient to call for assistance before getting out of bed or chair/Non-slip footwear when patient is out of bed/[place] to call system/Physically safe environment - no spills, clutter or unnecessary equipment/Purposeful Proactive Rounding/Room/bathroom lighting operational, light cord in reach

## 2022-07-08 NOTE — OB RN TRIAGE NOTE - NS_CONTRACTIONS_OBGYN_ALL_OB
"Silvestre Montero III has been discharged from the Emergency Room.    Discharge instructions, which include signs and symptoms to monitor at home for, as well as detailed information regarding left wrist and left knee sprain provided.  All questions and concerns addressed at this time.      Patient leaves ER in no apparent distress. This RN provided education regarding returning to the ER for any new concerns or changes in patient's condition.      /78   Pulse 88   Temp 37.4 °C (99.4 °F) (Temporal)   Resp 18   Ht 1.753 m (5' 9\")   Wt 108 kg (238 lb 12.1 oz)   SpO2 98%   BMI 35.26 kg/m²     " Mild

## 2022-07-08 NOTE — OB RN TRIAGE NOTE - RESPIRATORY RATE (BREATHS/MIN)
"Pt's wife (Mali) is the caller.  Authorized rep on pt's chart.    States \"Pt's BP med is almost depleted.\"  Hopes for refill today please.    Pharmacy has already transmitted electronic request for lisinopril-hydrochlorothiazide refill, currently pending in chart.  Therefore will route as high priority.    No further questions at this time.    Marilyn ANAYA [facility] Nurse Advisor   " 20

## 2022-07-08 NOTE — PRE-ANESTHESIA EVALUATION ADULT - MALLAMPATI CLASS
ERIN Johnson from Dr. Prado office called. Patient instructed to start bridging Lovenox on Sunday, 3/6/22, with 2 doses, one in the morning and one in the evening, On Monday, 3/7/22, only take one dose of Lovenox in the morning. Patient may resume warfarin on 3/8/22 after the breast biopsy in the evening.   Class II - visualization of the soft palate, fauces, and uvula

## 2022-07-08 NOTE — OB PROVIDER H&P - ASSESSMENT
Assessment  – No prenatal issues. GBS+.    Plan  1. Admit to LND. Routine Labs. IVF.  2. IOL w/ pit  3. Fetus: cat 2 tracing. VTX. EFW 3600g by sono. Continuous EFM. Sono. No concerns.  4. Prenatal issues: none  5. GBS + give amp  6. Pain: IV pain meds/epidural PRN    Patient discussed with attending physician, Dr. Dragan Horn MD PGY1   Assessment  – No prenatal issues. GBS+.    Plan  1. Admit to LND. Routine Labs. IVF.  2. in labor  3. Fetus: cat 1 tracing. VTX. EFW 3600g by sono. Continuous EFM. Sono. No concerns.  4. Prenatal issues: none  5. GBS + give amp  6. Pain: IV pain meds/epidural PRN    Patient discussed with attending physician, Dr. Dragan Horn MD PGY1

## 2022-07-08 NOTE — OB PROVIDER H&P - HISTORY OF PRESENT ILLNESS
Admission H&P    Subjective  HPI: 31yoF   at 39+4 presents in labor. +FM. -LOF. -CTXs. scant VB. Pt denies any other concerns.    – PNC: Denies prenatal issues. GBS+.  [id number] by rick.  – OBHx: none  – GynHx: follows with colpo yearly  – PMH: denies  – PSH: denies  – Psych: denies   – Social: denies   – Meds: PNV   – Allergies: NKDA  – Will accept blood transfusions? Yes

## 2022-07-08 NOTE — OB PROVIDER TRIAGE NOTE - NS ATTEND AMEND GEN_ALL_CORE FT
Patient discussed, nullip in early labor, made change from 3cm in the office to 3.5cm (but by different examiner) w irregular ctx. otherwise benign course. discussed options of expectant management w discharge home and returning for evaluation or admission as pt with reassuring fetal status. pt would like to go home and return when ctx are regular, more active labor. precautions given. NST reactive reassuring, toco q8-10 min.

## 2022-07-09 ENCOUNTER — TRANSCRIPTION ENCOUNTER (OUTPATIENT)
Age: 32
End: 2022-07-09

## 2022-07-09 LAB
BLD GP AB SCN SERPL QL: NEGATIVE — SIGNIFICANT CHANGE UP
HIV 1+2 AB+HIV1 P24 AG SERPL QL IA: SIGNIFICANT CHANGE UP
PROT ?TM UR-MCNC: 11 MG/DL — SIGNIFICANT CHANGE UP (ref 0–12)
RH IG SCN BLD-IMP: POSITIVE — SIGNIFICANT CHANGE UP
RH IG SCN BLD-IMP: POSITIVE — SIGNIFICANT CHANGE UP
T PALLIDUM AB TITR SER: NEGATIVE — SIGNIFICANT CHANGE UP

## 2022-07-09 PROCEDURE — 88307 TISSUE EXAM BY PATHOLOGIST: CPT | Mod: 26

## 2022-07-09 PROCEDURE — 74018 RADEX ABDOMEN 1 VIEW: CPT | Mod: 26

## 2022-07-09 RX ORDER — OXYCODONE HYDROCHLORIDE 5 MG/1
5 TABLET ORAL ONCE
Refills: 0 | Status: DISCONTINUED | OUTPATIENT
Start: 2022-07-09 | End: 2022-07-11

## 2022-07-09 RX ORDER — MAGNESIUM HYDROXIDE 400 MG/1
30 TABLET, CHEWABLE ORAL
Refills: 0 | Status: DISCONTINUED | OUTPATIENT
Start: 2022-07-09 | End: 2022-07-11

## 2022-07-09 RX ORDER — OXYCODONE HYDROCHLORIDE 5 MG/1
5 TABLET ORAL
Refills: 0 | Status: DISCONTINUED | OUTPATIENT
Start: 2022-07-09 | End: 2022-07-09

## 2022-07-09 RX ORDER — OXYTOCIN 10 UNIT/ML
333.33 VIAL (ML) INJECTION
Qty: 20 | Refills: 0 | Status: DISCONTINUED | OUTPATIENT
Start: 2022-07-09 | End: 2022-07-11

## 2022-07-09 RX ORDER — DEXAMETHASONE 0.5 MG/5ML
4 ELIXIR ORAL EVERY 6 HOURS
Refills: 0 | Status: DISCONTINUED | OUTPATIENT
Start: 2022-07-09 | End: 2022-07-11

## 2022-07-09 RX ORDER — OXYCODONE HYDROCHLORIDE 5 MG/1
10 TABLET ORAL
Refills: 0 | Status: DISCONTINUED | OUTPATIENT
Start: 2022-07-09 | End: 2022-07-09

## 2022-07-09 RX ORDER — ONDANSETRON 8 MG/1
4 TABLET, FILM COATED ORAL EVERY 6 HOURS
Refills: 0 | Status: DISCONTINUED | OUTPATIENT
Start: 2022-07-09 | End: 2022-07-11

## 2022-07-09 RX ORDER — TETANUS TOXOID, REDUCED DIPHTHERIA TOXOID AND ACELLULAR PERTUSSIS VACCINE, ADSORBED 5; 2.5; 8; 8; 2.5 [IU]/.5ML; [IU]/.5ML; UG/.5ML; UG/.5ML; UG/.5ML
0.5 SUSPENSION INTRAMUSCULAR ONCE
Refills: 0 | Status: DISCONTINUED | OUTPATIENT
Start: 2022-07-09 | End: 2022-07-11

## 2022-07-09 RX ORDER — OXYCODONE HYDROCHLORIDE 5 MG/1
5 TABLET ORAL
Refills: 0 | Status: COMPLETED | OUTPATIENT
Start: 2022-07-09 | End: 2022-07-16

## 2022-07-09 RX ORDER — SODIUM CHLORIDE 9 MG/ML
1000 INJECTION, SOLUTION INTRAVENOUS
Refills: 0 | Status: DISCONTINUED | OUTPATIENT
Start: 2022-07-09 | End: 2022-07-11

## 2022-07-09 RX ORDER — MORPHINE SULFATE 50 MG/1
2 CAPSULE, EXTENDED RELEASE ORAL ONCE
Refills: 0 | Status: DISCONTINUED | OUTPATIENT
Start: 2022-07-09 | End: 2022-07-10

## 2022-07-09 RX ORDER — NALOXONE HYDROCHLORIDE 4 MG/.1ML
0.1 SPRAY NASAL
Refills: 0 | Status: DISCONTINUED | OUTPATIENT
Start: 2022-07-09 | End: 2022-07-11

## 2022-07-09 RX ORDER — HEPARIN SODIUM 5000 [USP'U]/ML
5000 INJECTION INTRAVENOUS; SUBCUTANEOUS EVERY 12 HOURS
Refills: 0 | Status: DISCONTINUED | OUTPATIENT
Start: 2022-07-09 | End: 2022-07-11

## 2022-07-09 RX ORDER — SIMETHICONE 80 MG/1
80 TABLET, CHEWABLE ORAL EVERY 4 HOURS
Refills: 0 | Status: DISCONTINUED | OUTPATIENT
Start: 2022-07-09 | End: 2022-07-11

## 2022-07-09 RX ORDER — ACETAMINOPHEN 500 MG
975 TABLET ORAL
Refills: 0 | Status: DISCONTINUED | OUTPATIENT
Start: 2022-07-09 | End: 2022-07-11

## 2022-07-09 RX ORDER — ACETAMINOPHEN 500 MG
1000 TABLET ORAL ONCE
Refills: 0 | Status: COMPLETED | OUTPATIENT
Start: 2022-07-09 | End: 2022-07-09

## 2022-07-09 RX ORDER — LANOLIN
1 OINTMENT (GRAM) TOPICAL EVERY 6 HOURS
Refills: 0 | Status: DISCONTINUED | OUTPATIENT
Start: 2022-07-09 | End: 2022-07-11

## 2022-07-09 RX ORDER — KETOROLAC TROMETHAMINE 30 MG/ML
30 SYRINGE (ML) INJECTION EVERY 6 HOURS
Refills: 0 | Status: DISCONTINUED | OUTPATIENT
Start: 2022-07-09 | End: 2022-07-10

## 2022-07-09 RX ORDER — IBUPROFEN 200 MG
600 TABLET ORAL EVERY 6 HOURS
Refills: 0 | Status: COMPLETED | OUTPATIENT
Start: 2022-07-09 | End: 2023-06-07

## 2022-07-09 RX ORDER — DIPHENHYDRAMINE HCL 50 MG
25 CAPSULE ORAL EVERY 6 HOURS
Refills: 0 | Status: DISCONTINUED | OUTPATIENT
Start: 2022-07-09 | End: 2022-07-11

## 2022-07-09 RX ORDER — OXYTOCIN 10 UNIT/ML
2 VIAL (ML) INJECTION
Qty: 30 | Refills: 0 | Status: DISCONTINUED | OUTPATIENT
Start: 2022-07-09 | End: 2022-07-09

## 2022-07-09 RX ADMIN — Medication 975 MILLIGRAM(S): at 23:29

## 2022-07-09 RX ADMIN — Medication 400 MILLIGRAM(S): at 10:03

## 2022-07-09 RX ADMIN — Medication 108 GRAM(S): at 01:31

## 2022-07-09 RX ADMIN — Medication 108 GRAM(S): at 05:38

## 2022-07-09 RX ADMIN — SODIUM CHLORIDE 125 MILLILITER(S): 9 INJECTION, SOLUTION INTRAVENOUS at 11:20

## 2022-07-09 RX ADMIN — Medication 108 GRAM(S): at 05:35

## 2022-07-09 RX ADMIN — HEPARIN SODIUM 5000 UNIT(S): 5000 INJECTION INTRAVENOUS; SUBCUTANEOUS at 18:01

## 2022-07-09 RX ADMIN — Medication 30 MILLIGRAM(S): at 15:15

## 2022-07-09 RX ADMIN — Medication 975 MILLIGRAM(S): at 18:43

## 2022-07-09 RX ADMIN — Medication 30 MILLIGRAM(S): at 20:25

## 2022-07-09 RX ADMIN — Medication 30 MILLIGRAM(S): at 15:43

## 2022-07-09 RX ADMIN — Medication 975 MILLIGRAM(S): at 23:59

## 2022-07-09 RX ADMIN — Medication 975 MILLIGRAM(S): at 18:01

## 2022-07-09 NOTE — OB PROVIDER LABOR PROGRESS NOTE - NS_SUBJECTIVE/OBJECTIVE_OBGYN_ALL_OB_FT
[facility] END[facility]COPY ENCOUNTER  PRE-OPERATIVE HISTORY AND PHYSICAL    ADMISSION DATE:  12/22/2020  CONSULTING PHYSICIAN:  Donta Rainey MD  ATTENDING PHYSICIAN:  Donta Rainey MD   PCP: Corazon Butler DO  REQUESTING PROVIDER:  Donta Rainey MD  CODE STATUS:  Prior    SUBJECTIVE:    Selene Jolley is a 23year old female patient admitted with Altered bowel habits [R19.4]  Abdominal pain, left lower quadrant [R10.32]  Dyspepsia [R10.13]. Patient presents for ESOPHAGOGASTRODUODENOSCOPY with MAC, COLONOSCOPY with Monitor Anesthesia Care anesthesia. Reason for the procedure: Altered bowel habits [R19.4]  Abdominal pain, left lower quadrant [R10.32]  Dyspepsia [R10.13]    Patient has no new complaints. MEDS  â¢ sodium chloride (PF)  2 mL Intracatheter 2 times per day   â¢ sodium chloride (PF)  2 mL Intracatheter 2 times per day       ALLERGIES:   Allergen Reactions   â¢ Prozac DEPRESSION     Past Medical History:   Diagnosis Date   â¢ ADHD (attention deficit hyperactivity disorder)    â¢ Allergy    â¢ Anxiety    â¢ Depression    â¢ Gastroenteritis     traveler's diarrhea   â¢ Gilbert syndrome     Liver u/s 12/28/12 & 8/3/12 unremarkable; Peds GI evaluated w/ no concerns. â¢ Orthostatic hypotension 11/20/2014   â¢ PCOS (polycystic ovarian syndrome)    â¢ Scheuermann disease      Past Surgical History:   Procedure Laterality Date   â¢ Colonoscopy  12/22/2020   â¢ Spinal fusion  2015   â¢ Tonsillectomy and adenoidectomy       Social History     Tobacco Use   â¢ Smoking status: Never Smoker   â¢ Smokeless tobacco: Never Used   Substance Use Topics   â¢ Alcohol use:  Yes     Alcohol/week: 0.0 standard drinks     Frequency: 2-4 times a month     Drinks per session: 1 or 2     Binge frequency: Never     Comment: rarely    â¢ Drug use: No     Family History   Problem Relation Age of Onset   â¢ High blood pressure Mother    â¢ High cholesterol Mother    â¢ Diabetes Mother    â¢ Arthritis Mother        REVIEW OF SYSTEMS:    All
R3 Note 07-09-22 @ 04:09    Pt seen for evaluated for progression    VITALS:  T(C): 37.3 (07-09-22 @ 03:41), Max: 37.3 (07-09-22 @ 03:41)  HR: 84 (07-09-22 @ 04:05) (70 - 121)  BP: 126/65 (07-09-22 @ 03:55) (108/68 - 152/70)  RR: 19 (07-09-22 @ 03:41) (18 - 20)  SpO2: 100% (07-09-22 @ 04:05) (89% - 100%)    EFM:  BPM, Mod Variability, +Accel, -Decels  [place]: Ctx Q2-3min  VE: 5/80/-2     FHR Category: 1    PLAN:  Start pit 2x2      d/w Dr Yasmine Moncada PGY4
"other systems are reviewed and are negative except as documented in the history of present illness. PHYSICAL EXAM:    Vital Signs: Blood pressure 110/64, pulse 62, temperature 97.9 Â°F (36.6 Â°C), temperature source Temporal, resp. rate 15, height 5' 11"" (1.803 m), weight 86.2 kg, last menstrual period 12/10/2020, SpO2 98 %. General: The patient is well developed, well nourished, in no acute distress, appears stated age. Respiratory: Respiratory effort normal.   Clear to auscultation bilaterally. Cardiovascular: Regular rate and rhythm. Gastrointestinal:  Soft and nontender. ASSESSMENT / PLAN:     Altered bowel habits [R19.4]  Abdominal pain, left lower quadrant [R10.32]  Dyspepsia [R10.13]    Risks, benefits and alternatives of the ESOPHAGOGASTRODUODENOSCOPY with MAC, COLONOSCOPY with Monitor Anesthesia Care anesthesia was discussed with the patient. The patient voiced understanding and agrees to proceed.     SIGNED:  Ana Edwards MD  12/22/2020  3:07 PM                "
pt with rectal pressure  also concerns about having her mother in at her delivery

## 2022-07-09 NOTE — OB RN DELIVERY SUMMARY - NS_SEPSISRSKCALC_OBGYN_ALL_OB_FT
EOS calculated successfully. EOS Risk Factor: 0.5/1000 live births ([facility] national incidence); GA=39w5d; Temp=99.14; ROM=0.683; GBS='Positive'; Antibiotics='Broad spectrum antibiotics > 4 hrs prior to birth'

## 2022-07-09 NOTE — OB RN PATIENT PROFILE - BP NONINVASIVE DIASTOLIC (MM HG)
72 What Type Of Note Output Would You Prefer (Optional)?: Bullet Format Has Your Skin Lesion Been Treated?: not been treated Is This A New Presentation, Or A Follow-Up?: Growth

## 2022-07-09 NOTE — DISCHARGE NOTE OB - HOSPITAL COURSE
Pt admitted  uncomplicated section done as emergency for fetal bradycardia  Normal post op course  VSS and afebrile  incision healing well  min lochia  d/c home pod3  instructions given - pain meds reviewed  f/u 2 weeks for post op check

## 2022-07-09 NOTE — OB RN PATIENT PROFILE - NS_EDDCALCULATED_OBGYN_ALL_OB
PATIENT NAME:  JIAN BERMUDEZ                            MEDICAL RECORD: [id number]
:33                                             LOCATION:D.     D.2138
                                                         ADMISSION DATE:17
SURGEON:  FAUSTINA WRIGHT MD            
 
 
DATE OF OPERATION:  2017
 
REFERRED BY:  Mark Britton MD
 
[place] NEPHROLOGIST:  Mark Barron MD
 
PREOPERATIVE DIAGNOSES:  End-stage renal disease and dependence on dialysis, now
with a thrombosed left upper extremity HeRO AV graft.
 
POSTOPERATIVE DIAGNOSES:  End-stage renal disease and dependence on dialysis,
now with a thrombosed left upper extremity HeRO AV graft.
 
OPERATION PERFORMED:  Fistulogram with AngioJet mechanical thrombolysis, balloon
angioplasty, and selective brachial arteriogram.
 
SURGEON:  Faustina Wright MD
 
ANESTHESIA:  TIVA per CRNA.
 
PREOPERATIVE NOTE:  Ms. Bermudez is an 80-year-old -American female from
[place], [place], who has been on dialysis now for some time.  She has had
numerous accesses and has extensive central vein stenosis.  I placed a HeRO
graft in her in September of this year, just about 2 months ago, and she has
been doing fine from that as far as I know until she presented for dialysis
yesterday in [place] and her graft was thrombosed.  The patient and her family
relate that she had been having postcannulation bleeding problems.  She is not
on anticoagulants.  I believe there is a history of GI bleeding.  The graft,
which I placed in September, is an Acuseal connected to the HeRO, although there
is a short length of tapered Propaten graft on the arterial anastomosis end. 
She is brought to the operating room now to try to salvage the fistula or
access.
 
DESCRIPTION OF PROCEDURE:  Under TIVA, the patient was placed in supine position
and prepped and draped in sterile manner.  It turned out no local anesthetic was
needed.  The graft was accessed with ultrasound guidance and micropuncture
technique.  Two 6-Ghanaian introducers were placed in opposing retrograde and
antegrade corrections.  A Glidewire was passed proximally to the right atrium
and an AngioJet catheter was used to lyse and remove the thrombus from the body
of the graft and the venous outflow device.  Contrast injection revealed a
fairly mild stenosis of the graft just distal to the connector and this was
dilated with a 6-mm balloon with full effacement very very easily.  I passed a
Glidewire then in a retrograde direction from the proximal port to the brachial
artery anastomosis and selectively advanced the wire into the proximal brachial
artery.  A glide catheter was inserted and contrast injection then made with
digital subtraction angiography to perform the selective brachial artery
arteriogram.  There was an area of clot producing stenosis in the arterial
anastomosis and the arterial anastomosis itself has a beaded deformity with
areas of mild stenosis proximal and distal to the anastomosis right at the top
and bottom ends.  I subsequently dilated this area with a 4-mm diameter
angioplasty balloon and inflated my hand without any difficulty to full
effacement.  Repeated selective brachial arteriogram revealed what I thought was
evidence of embolization in the distal brachial artery with reduced flow into
 
 
 
OPERATIVE REPORT                               [id number]    JIAN BERMUDEZ          
 
 
the forearm.  I passed the wire and catheter then into the distal brachial
artery and repeat contrast injections revealed significant improvement with flow
into the hand via both radial and ulnar arteries.  I was concerned that there
had been a shower of some emboli at least and went ahead and injected 2 mg bolus
of TPA into the distal brachial artery to help breakup any thrombi which might
be obstructing distal flow, especially in the hand.  The graft was seen to be
functioning well with no residual stenosis in the arterial anastomosis and good
flow all the way to the right atrium.  The patient had been given 5000 units of
heparin at the beginning of the procedure and this was not reversed.  The ports
were removed and hemostasis was obtained with direct digital pressure and
figure-of-eight 4-0 Prolene sutures for skin.  Dressings of Avitene Ultrafoam,
Tegaderm, and Cavilon skin prep were applied.  The patient, in stable condition,
was taken to the recovery room.
 
There was minimal blood loss, about 5 cc.  None was replaced intraoperatively of
course.  All sponges, instruments, and needles were accounted for.  No drain was
used.
 
PLAN:  The patient will need to have dialysis here in [place] either today
or tomorrow before she can be released to go home to [place], [place].  I have
not reversed her heparin today and I am giving her 150 mg dose of Plavix in the
recovery room.  My preference would be that she would be kept on anticoagulation
with either Coumadin or Plavix; however, there is a history of some type of GI
bleeding problem and I believe that her medical doctors would be better able to
 her risk for long-term anticoagulation therapy.  We will ask Dr. Barron
and Dr. Britton to make that decision.  I do not need to see her back in my
office.  I do recommend of course that she be referred to [facility] for angiography
anytime if there is a change in pattern; postcannulation bleeding; any change in
her pressures, Kt/V, access flows, etc.  Hopefully, we can head off any acute
thrombosis in that manner.
 
TRANSINT:[id number] Voice Confirmation ID: [id number] DOCUMENT ID: [id number]
                                           
                                           FAUSTINA WRIGHT MD            
 
 
 
Electronically Signed by FAUSTINA WRIGHT on 17 at 1300
 
 
 
 
 
 
 
 
 
CC: MARK BRITTON MD                                       [id number]
DICTATION DATE: 17 1427     :     17 1531      DIS IN  
                                                                      17
[address] [place] [address] First Trimester Sonogram

## 2022-07-09 NOTE — DISCHARGE NOTE OB - MEDICATION SUMMARY - MEDICATIONS TO TAKE
I will START or STAY ON the medications listed below when I get home from the hospital:    acetaminophen-codeine 300 mg-30 mg oral tablet  -- 1 tab(s) by mouth every 6 hours MDD:4 tablets  -- Caution federal law prohibits the transfer of this drug to any person other  than the person for whom it was prescribed.  May cause drowsiness.  Alcohol may intensify this effect.  Use care when operating dangerous machinery.  This product contains acetaminophen.  Do not use  with any other product containing acetaminophen to prevent possible liver damage.  Using more of this medication than prescribed may cause serious breathing problems.    -- Indication: For Single delivery by  section    ibuprofen 600 mg oral tablet  -- 1 tab(s) by mouth every 6 hours  -- Indication: For mild to moderate pain    Prenatal Multi + DHA oral capsule  -- 1 cap(s) by mouth once a day  -- Indication: For Supplement    ferrous sulfate 300 mg (60 mg elemental iron) oral tablet  -- 1 tab(s) by mouth 2 times a day  -- Indication: For anemia   I will START or STAY ON the medications listed below when I get home from the hospital:    ibuprofen 600 mg oral tablet  -- 1 tab(s) by mouth every 6 hours  -- Indication: For mild to moderate pain    Actamin 500 mg oral tablet  -- 2 tab(s) by mouth every 6 hours  -- Indication: For mild to moderate pain    Prenatal Multi + DHA oral capsule  -- 1 cap(s) by mouth once a day  -- Indication: For Supplement    ferrous sulfate 300 mg (60 mg elemental iron) oral tablet  -- 1 tab(s) by mouth 2 times a day  -- Indication: For anemia

## 2022-07-09 NOTE — DISCHARGE NOTE OB - NS MD DC FALL RISK RISK
For information on Fall & Injury Prevention, visit: [email or web address] OR  [email or web address] OR  https://www.cdc.gov/steadi/patient.html

## 2022-07-09 NOTE — OB RN PATIENT PROFILE - FALL HARM RISK - UNIVERSAL INTERVENTIONS
Bed in lowest position, wheels locked, appropriate side rails in place/Call bell, personal items and telephone in reach/Instruct patient to call for assistance before getting out of bed or chair/Non-slip footwear when patient is out of bed/[place] to call system/Physically safe environment - no spills, clutter or unnecessary equipment/Purposeful Proactive Rounding/Room/bathroom lighting operational, light cord in reach

## 2022-07-09 NOTE — OB PROVIDER DELIVERY SUMMARY - NSSELHIDDEN_OBGYN_ALL_OB_FT
[NS_DeliveryAttending1_OBGYN_ALL_OB_FT:[id number]==],[NS_DeliveryAssist1_OBGYN_ALL_OB_FT:[email or web address]=]

## 2022-07-09 NOTE — DISCHARGE NOTE OB - PLAN OF CARE
routine pp care;  limited physical and sexual activities for 5-6 weeks;  to office in 2 weeks;  regular diet

## 2022-07-09 NOTE — DISCHARGE NOTE OB - MATERIALS PROVIDED
Vaccinations/[facility]  Screening Program/  Immunization Record/Bottle Feeding Log/Guide to Postpartum Care/[facility] Hearing Screen Program/Back To Sleep Handout/Shaken Baby Prevention Handout/Birth Certificate Instructions/Discharge Medication Information for Patients and Families Pocket Guide

## 2022-07-09 NOTE — OB PROVIDER DELIVERY SUMMARY - NSPROVIDERDELIVERYNOTE_OBGYN_ALL_OB_FT
Terminal Bradycardia started with decel at 7:57 - FSE placed and confirmed FH in the 50s - was 10/100/-2-  though a tetantic contraction not noted terb given x1 to try to halt ctx and given time for fetal recovery   moved to the OR for stat section - RN and anesthesia notified  patient was rechecked by Dr. Sellers and was 10/100/-2  entered OR B at 8:04 and skin at 8:09 (GETA placed because patient did not have adequate level)  delivery of viable baby boy - Spontaneous cry and good tone on delivery  APGARS 8/9 (peds present)  8lb 2oz - clear fluid  uo 300  IVF 1.8L  Xray done and read by me and Radiology attending - no retained laps/equpiment  QBL 1352 BUT EBL was 800ml - will track VSS and h/h as needed  brief uterine atony pitcoin/ methergine x1   job # [id number] Terminal Bradycardia started with decel at 7:57 - FSE placed and confirmed FH in the 50s - was 10/100/-2-  though a tetantic contraction not noted terb given x1 to try to halt ctx and given time for fetal recovery   moved to the OR for stat section - RN and anesthesia notified  patient was rechecked by Dr. Sellers and was 10/100/-2  entered OR B at 8:04 and skin at 8:09 (GETA placed because patient did not have adequate level)  delivery of viable baby boy - Spontaneous cry and good tone on delivery  APGARS 8/9 (peds present)  8lb 2oz - clear fluid  uo 300  IVF 1.8L  Xray done and read by me and Radiology attending - no retained laps/equpiment  QBL 1352 BUT EBL was 800ml - will track VSS and h/h as needed  brief uterine atony pitcoin/ methergine x1   placenta to pathology  ancef/letha given  job # [id number]

## 2022-07-09 NOTE — OB PROVIDER LABOR PROGRESS NOTE - ASSESSMENT
assuming care of patient from Dr. Archibald  in brief P0 admitted in early labor  still in early labor  AROM done  pit as needed  fetal status reassuring  cont toco and efm  cont epidural for top off now  reviewed covid symptoms and timing - symptoms started 6/29 and tested 7/2 - based on symptoms of day 1 - today is past 10 days (she did not get the vaccine)  will confirm with ID about protcols - she would like her mother in the delivery who was vaccinated and boosted with proof      Oriana Barclay MD

## 2022-07-09 NOTE — OB RN DELIVERY SUMMARY - NSSELHIDDEN_OBGYN_ALL_OB_FT
[NS_DeliveryAttending1_OBGYN_ALL_OB_FT:[id number]==],[NS_DeliveryAssist1_OBGYN_ALL_OB_FT:[id number]=],[NS_DeliveryRN_OBGYN_ALL_OB_FT:[id number]==]

## 2022-07-09 NOTE — OB RN INTRAOPERATIVE NOTE - NSSELHIDDEN_OBGYN_ALL_OB_FT
[NS_DeliveryAttending1_OBGYN_ALL_OB_FT:[id number]==],[NS_DeliveryAssist1_OBGYN_ALL_OB_FT:[email or web address]=],[NS_DeliveryRN_OBGYN_ALL_OB_FT:[id number]==]

## 2022-07-09 NOTE — DISCHARGE NOTE OB - PATIENT PORTAL LINK FT
You can access the FollowMyHealth Patient Portal offered by [facility] by registering at the following website: [email or web address]. By joining [facility]’s FollowMyHealth portal, you will also be able to view your health information using other applications (apps) compatible with our system.

## 2022-07-09 NOTE — OB RN PATIENT PROFILE - NS_DATEOFLASTVISIT_OBGYN_ALL_OB_DT
Detail Level: Simple Additional Notes: Patient consent was obtained to proceed with the visit and recommended plan of care after discussion of all risks and benefits, including the risks of COVID-19 exposure. 05-Jul-2022

## 2022-07-09 NOTE — OB NEONATOLOGY/PEDIATRICIAN DELIVERY SUMMARY - NSPEDSNEONOTESA_OBGYN_ALL_OB_FT
Peds team directed by Dr Swanson,  Attending, responded to  Code 100 for peolonged bardaycardia of a 39.4 wk male infant.  Mom is 41 yo  O+/PNL (-)/immune/GBS + - Rx w/ ampicillin x 2 doses pre del;vicki/ Mom had symptomatic Covid  - cleared by ID.  Unremarkable maternal hx .  pregnancy complicated by AMA.  AROM @ 0729 - clear fluid.  During labor, prolonged bradycardia noted to 50s so stat C/S under GA performed.  Infant emerged in cephalic position w/ good tone and spontaneous cry.  Brought to warmer, dried, stim and received CPAP 4/21% x 3 min for color and increased WOB w/ improvement noted.  O2 sats 94% in RA @ 7.5 minutes of life.  3 vessels in cord.  Testes descended b/l.  PE unremarkable.  Apgars 8/9.  EOS 0.08.  Mom plans r=to exclusively breastfeed, defers Hep B vaccine, desires circumcision, PMD is Dr Ackerman.  Infant transitioned to non-separation and routine care. Peds team directed by Dr Swanson,  Attending, responded to  Code 100 for prolonged bardaycardia of a 39.4 wk male infant.  Mom is 41 yo  O+/PNL (-)/immune/GBS + - Rx w/ ampicillin x 2 doses pre del;vicki/ Mom had symptomatic Covid  - cleared by ID.  Unremarkable maternal hx .  pregnancy complicated by AMA.  AROM @ 0729 - clear fluid.  During labor, prolonged bradycardia noted to 50s so stat C/S under GA performed.  Infant emerged in cephalic position w/ good tone and spontaneous cry.  Brought to warmer, dried, stim and received CPAP 4/21% x 3 min for color and increased WOB w/ improvement noted.  O2 sats 94% in RA @ 7.5 minutes of life.  3 vessels in cord.  Testes descended b/l.  PE unremarkable.  Apgars 8/9.  EOS 0.08.  Mom plans r=to exclusively breastfeed, defers Hep B vaccine, desires circumcision, PMD is Dr Ackerman.  Infant transitioned to non-separation and routine care.

## 2022-07-09 NOTE — CHART NOTE - NSCHARTNOTEFT_GEN_A_CORE
R4 Chart Note    Spoke with ID Fellow  regarding patients COVID status. Patient was symptomatic starting June 29th but only had her COVID test performed on 7/2. Per ID, 10 day period should start on day of symptom onset. Therefore, patient is cleared from any precautions as she is also asymptomatic and afberile >48hrs.    ru Verma PGY4

## 2022-07-09 NOTE — DISCHARGE NOTE OB - NS OB DC TDAP REASON NOT RECEIVED
Patient calls requesting a refill on his Adderall XR 15mg 1 cap daily.  Last refill was 8/16/20 #30 with 0 refills.  He was last seen 11/26/19 and has next appointment 12/1/20.    Okay to refill to [facility] in [place]?  Only need to call him back if there are questions/concerns    Prescription Drug Monitoring Program review: Criteria met. Forwarded to Physician/MAGDALENA for signature.       
Refused

## 2022-07-09 NOTE — DISCHARGE NOTE OB - CARE PLAN
1 Principal Discharge DX:	Single delivery by  section   Principal Discharge DX:	Single delivery by  section  Assessment and plan of treatment:	routine pp care;  limited physical and sexual activities for 5-6 weeks;  to office in 2 weeks;  regular diet

## 2022-07-10 LAB
HCT VFR BLD CALC: 26.2 % — LOW (ref 34.5–45)
HGB BLD-MCNC: 8.5 G/DL — LOW (ref 11.5–15.5)
MCHC RBC-ENTMCNC: 29.4 PG — SIGNIFICANT CHANGE UP (ref 27–34)
MCHC RBC-ENTMCNC: 32.4 GM/DL — SIGNIFICANT CHANGE UP (ref 32–36)
MCV RBC AUTO: 90.7 FL — SIGNIFICANT CHANGE UP (ref 80–100)
NRBC # BLD: 0 /100 WBCS — SIGNIFICANT CHANGE UP (ref 0–0)
PLATELET # BLD AUTO: 153 K/UL — SIGNIFICANT CHANGE UP (ref 150–400)
RBC # BLD: 2.89 M/UL — LOW (ref 3.8–5.2)
RBC # FLD: 13 % — SIGNIFICANT CHANGE UP (ref 10.3–14.5)
WBC # BLD: 11.79 K/UL — HIGH (ref 3.8–10.5)
WBC # FLD AUTO: 11.79 K/UL — HIGH (ref 3.8–10.5)

## 2022-07-10 RX ORDER — IBUPROFEN 200 MG
600 TABLET ORAL EVERY 6 HOURS
Refills: 0 | Status: DISCONTINUED | OUTPATIENT
Start: 2022-07-10 | End: 2022-07-11

## 2022-07-10 RX ORDER — OXYCODONE HYDROCHLORIDE 5 MG/1
5 TABLET ORAL
Refills: 0 | Status: DISCONTINUED | OUTPATIENT
Start: 2022-07-10 | End: 2022-07-11

## 2022-07-10 RX ADMIN — Medication 975 MILLIGRAM(S): at 12:38

## 2022-07-10 RX ADMIN — HEPARIN SODIUM 5000 UNIT(S): 5000 INJECTION INTRAVENOUS; SUBCUTANEOUS at 17:28

## 2022-07-10 RX ADMIN — Medication 600 MILLIGRAM(S): at 21:35

## 2022-07-10 RX ADMIN — Medication 975 MILLIGRAM(S): at 06:14

## 2022-07-10 RX ADMIN — Medication 600 MILLIGRAM(S): at 15:54

## 2022-07-10 RX ADMIN — Medication 975 MILLIGRAM(S): at 17:28

## 2022-07-10 RX ADMIN — OXYCODONE HYDROCHLORIDE 5 MILLIGRAM(S): 5 TABLET ORAL at 21:25

## 2022-07-10 RX ADMIN — OXYCODONE HYDROCHLORIDE 5 MILLIGRAM(S): 5 TABLET ORAL at 21:55

## 2022-07-10 RX ADMIN — Medication 600 MILLIGRAM(S): at 21:05

## 2022-07-10 RX ADMIN — Medication 975 MILLIGRAM(S): at 12:08

## 2022-07-10 RX ADMIN — Medication 30 MILLIGRAM(S): at 10:30

## 2022-07-10 RX ADMIN — Medication 975 MILLIGRAM(S): at 17:58

## 2022-07-10 RX ADMIN — Medication 600 MILLIGRAM(S): at 15:24

## 2022-07-10 RX ADMIN — MAGNESIUM HYDROXIDE 30 MILLILITER(S): 400 TABLET, CHEWABLE ORAL at 17:28

## 2022-07-10 RX ADMIN — Medication 30 MILLIGRAM(S): at 10:00

## 2022-07-10 RX ADMIN — Medication 30 MILLIGRAM(S): at 02:34

## 2022-07-10 RX ADMIN — HEPARIN SODIUM 5000 UNIT(S): 5000 INJECTION INTRAVENOUS; SUBCUTANEOUS at 06:14

## 2022-07-10 NOTE — PROGRESS NOTE ADULT - ASSESSMENT
A/P: 32yo  POD#1 s/p pLTCS for Cat III tracing. Patient is stable and doing well post-operatively.    - Continue regular diet.  - Increase ambulation.  - Continue motrin, tylenol, oxycodone PRN for pain control  - F/u AM CBC    João Landin MD PGY1

## 2022-07-11 VITALS
DIASTOLIC BLOOD PRESSURE: 84 MMHG | TEMPERATURE: 99 F | RESPIRATION RATE: 18 BRPM | HEIGHT: 63 IN | OXYGEN SATURATION: 95 % | HEART RATE: 97 BPM | WEIGHT: 197.09 LBS | SYSTOLIC BLOOD PRESSURE: 130 MMHG

## 2022-07-11 PROCEDURE — 85025 COMPLETE CBC W/AUTO DIFF WBC: CPT

## 2022-07-11 PROCEDURE — 82570 ASSAY OF URINE CREATININE: CPT

## 2022-07-11 PROCEDURE — 86703 HIV-1/HIV-2 1 RESULT ANTBDY: CPT

## 2022-07-11 PROCEDURE — 85610 PROTHROMBIN TIME: CPT

## 2022-07-11 PROCEDURE — 59050 FETAL MONITOR W/REPORT: CPT

## 2022-07-11 PROCEDURE — 83615 LACTATE (LD) (LDH) ENZYME: CPT

## 2022-07-11 PROCEDURE — 81001 URINALYSIS AUTO W/SCOPE: CPT

## 2022-07-11 PROCEDURE — 84550 ASSAY OF BLOOD/URIC ACID: CPT

## 2022-07-11 PROCEDURE — 86850 RBC ANTIBODY SCREEN: CPT

## 2022-07-11 PROCEDURE — 59025 FETAL NON-STRESS TEST: CPT

## 2022-07-11 PROCEDURE — G0463: CPT

## 2022-07-11 PROCEDURE — 86780 TREPONEMA PALLIDUM: CPT

## 2022-07-11 PROCEDURE — 85730 THROMBOPLASTIN TIME PARTIAL: CPT

## 2022-07-11 PROCEDURE — 86900 BLOOD TYPING SEROLOGIC ABO: CPT

## 2022-07-11 PROCEDURE — 86901 BLOOD TYPING SEROLOGIC RH(D): CPT

## 2022-07-11 PROCEDURE — 86769 SARS-COV-2 COVID-19 ANTIBODY: CPT

## 2022-07-11 PROCEDURE — 84156 ASSAY OF PROTEIN URINE: CPT

## 2022-07-11 PROCEDURE — 36415 COLL VENOUS BLD VENIPUNCTURE: CPT

## 2022-07-11 PROCEDURE — 80053 COMPREHEN METABOLIC PANEL: CPT

## 2022-07-11 PROCEDURE — 85384 FIBRINOGEN ACTIVITY: CPT

## 2022-07-11 PROCEDURE — 74018 RADEX ABDOMEN 1 VIEW: CPT

## 2022-07-11 PROCEDURE — 88307 TISSUE EXAM BY PATHOLOGIST: CPT

## 2022-07-11 PROCEDURE — 87389 HIV-1 AG W/HIV-1&-2 AB AG IA: CPT

## 2022-07-11 RX ORDER — ACETAMINOPHEN 500 MG
2 TABLET ORAL
Qty: 0 | Refills: 0 | DISCHARGE
Start: 2022-07-11

## 2022-07-11 RX ORDER — IBUPROFEN 200 MG
1 TABLET ORAL
Qty: 0 | Refills: 0 | DISCHARGE
Start: 2022-07-11

## 2022-07-11 RX ADMIN — Medication 975 MILLIGRAM(S): at 05:24

## 2022-07-11 RX ADMIN — Medication 975 MILLIGRAM(S): at 00:17

## 2022-07-11 RX ADMIN — Medication 975 MILLIGRAM(S): at 11:41

## 2022-07-11 RX ADMIN — Medication 600 MILLIGRAM(S): at 03:33

## 2022-07-11 RX ADMIN — Medication 600 MILLIGRAM(S): at 09:53

## 2022-07-11 RX ADMIN — Medication 975 MILLIGRAM(S): at 00:47

## 2022-07-11 RX ADMIN — OXYCODONE HYDROCHLORIDE 5 MILLIGRAM(S): 5 TABLET ORAL at 05:31

## 2022-07-11 RX ADMIN — Medication 975 MILLIGRAM(S): at 11:11

## 2022-07-11 RX ADMIN — Medication 600 MILLIGRAM(S): at 03:03

## 2022-07-11 RX ADMIN — HEPARIN SODIUM 5000 UNIT(S): 5000 INJECTION INTRAVENOUS; SUBCUTANEOUS at 05:24

## 2022-07-11 RX ADMIN — Medication 600 MILLIGRAM(S): at 09:11

## 2022-07-11 NOTE — PROGRESS NOTE ADULT - ASSESSMENT
A/P: 32yo POD#2 s/p LTCS.  Patient is stable and doing well post-operatively.    - Continue regular diet.  - Increase ambulation.  - Continue motrin, tylenol, oxycodone PRN for pain control. vs. D/c PCEA today and transition to PO pain medication with motrin, tylenol and oxycodone PRN.     Claudio Horn PGY1

## 2022-07-12 LAB
COVID-19 SPIKE DOMAIN AB INTERP: POSITIVE
COVID-19 SPIKE DOMAIN ANTIBODY RESULT: >800 AU/ML — HIGH
SARS-COV-2 IGG+IGM SERPL QL IA: >800 AU/ML — HIGH
SARS-COV-2 IGG+IGM SERPL QL IA: POSITIVE

## 2022-10-21 NOTE — PRE-ANESTHESIA EVALUATION ADULT - NSANTHTOBACCOSD_GEN_ALL_CORE
How Severe Is It?: moderate
Is This A New Presentation, Or A Follow-Up?: Nail Infection
Additional History: Patient is in office for discolored, thicken nails that are yellow and painful. Patient previous dermatologist gave her jublia but she states it too expensive.
No

## 2022-11-08 ENCOUNTER — RESULT REVIEW (OUTPATIENT)
Age: 32
End: 2022-11-08

## 2023-02-27 ENCOUNTER — APPOINTMENT (OUTPATIENT)
Dept: INTERNAL MEDICINE | Facility: CLINIC | Age: 33
End: 2023-02-27
Payer: COMMERCIAL

## 2023-02-27 VITALS
OXYGEN SATURATION: 100 % | WEIGHT: 158 LBS | SYSTOLIC BLOOD PRESSURE: 100 MMHG | HEART RATE: 67 BPM | DIASTOLIC BLOOD PRESSURE: 69 MMHG | BODY MASS INDEX: 28 KG/M2 | HEIGHT: 63 IN | TEMPERATURE: 98.5 F

## 2023-02-27 DIAGNOSIS — Z83.3 FAMILY HISTORY OF DIABETES MELLITUS: ICD-10-CM

## 2023-02-27 DIAGNOSIS — J01.00 ACUTE MAXILLARY SINUSITIS, UNSPECIFIED: ICD-10-CM

## 2023-02-27 DIAGNOSIS — K64.4 RESIDUAL HEMORRHOIDAL SKIN TAGS: ICD-10-CM

## 2023-02-27 DIAGNOSIS — Z87.09 PERSONAL HISTORY OF OTHER DISEASES OF THE RESPIRATORY SYSTEM: ICD-10-CM

## 2023-02-27 DIAGNOSIS — Z87.442 PERSONAL HISTORY OF URINARY CALCULI: ICD-10-CM

## 2023-02-27 DIAGNOSIS — Z28.21 IMMUNIZATION NOT CARRIED OUT BECAUSE OF PATIENT REFUSAL: ICD-10-CM

## 2023-02-27 DIAGNOSIS — J34.89 OTHER SPECIFIED DISORDERS OF NOSE AND NASAL SINUSES: ICD-10-CM

## 2023-02-27 DIAGNOSIS — N94.9 UNSPECIFIED CONDITION ASSOCIATED WITH FEMALE GENITAL ORGANS AND MENSTRUAL CYCLE: ICD-10-CM

## 2023-02-27 DIAGNOSIS — R22.0 LOCALIZED SWELLING, MASS AND LUMP, HEAD: ICD-10-CM

## 2023-02-27 DIAGNOSIS — N94.89 OTHER SPECIFIED CONDITIONS ASSOCIATED WITH FEMALE GENITAL ORGANS AND MENSTRUAL CYCLE: ICD-10-CM

## 2023-02-27 DIAGNOSIS — Z87.898 PERSONAL HISTORY OF OTHER SPECIFIED CONDITIONS: ICD-10-CM

## 2023-02-27 DIAGNOSIS — R31.29 OTHER MICROSCOPIC HEMATURIA: ICD-10-CM

## 2023-02-27 DIAGNOSIS — R43.9 UNSPECIFIED DISTURBANCES OF SMELL AND TASTE: ICD-10-CM

## 2023-02-27 DIAGNOSIS — M89.9 DISORDER OF BONE, UNSPECIFIED: ICD-10-CM

## 2023-02-27 DIAGNOSIS — Z98.890 OTHER SPECIFIED POSTPROCEDURAL STATES: ICD-10-CM

## 2023-02-27 DIAGNOSIS — N76.0 ACUTE VAGINITIS: ICD-10-CM

## 2023-02-27 DIAGNOSIS — Z87.42 PERSONAL HISTORY OF OTHER DISEASES OF THE FEMALE GENITAL TRACT: ICD-10-CM

## 2023-02-27 DIAGNOSIS — E66.3 OVERWEIGHT: ICD-10-CM

## 2023-02-27 DIAGNOSIS — R31.21 ASYMPTOMATIC MICROSCOPIC HEMATURIA: ICD-10-CM

## 2023-02-27 DIAGNOSIS — Z88.9 ALLERGY STATUS TO UNSPECIFIED DRUGS, MEDICAMENTS AND BIOLOGICAL SUBSTANCES: ICD-10-CM

## 2023-02-27 PROCEDURE — 99385 PREV VISIT NEW AGE 18-39: CPT | Mod: 25

## 2023-02-27 PROCEDURE — 36415 COLL VENOUS BLD VENIPUNCTURE: CPT

## 2023-02-27 RX ORDER — NITROFURANTOIN (MONOHYDRATE/MACROCRYSTALS) 25; 75 MG/1; MG/1
100 CAPSULE ORAL TWICE DAILY
Qty: 10 | Refills: 0 | Status: DISCONTINUED | COMMUNITY
Start: 2019-05-02 | End: 2023-02-27

## 2023-02-27 RX ORDER — FLUCONAZOLE 150 MG/1
150 TABLET ORAL
Qty: 4 | Refills: 3 | Status: DISCONTINUED | COMMUNITY
Start: 2020-07-23 | End: 2023-02-27

## 2023-02-27 RX ORDER — TINIDAZOLE 500 MG/1
500 TABLET, FILM COATED ORAL
Qty: 8 | Refills: 0 | Status: DISCONTINUED | COMMUNITY
Start: 2020-08-25 | End: 2023-02-27

## 2023-02-27 RX ORDER — HYDROCODONE BITARTRATE AND ACETAMINOPHEN 7.5; 325 MG/1; MG/1
7.5-325 TABLET ORAL
Qty: 20 | Refills: 0 | Status: DISCONTINUED | COMMUNITY
Start: 2019-05-02 | End: 2023-02-27

## 2023-02-27 RX ORDER — TAMSULOSIN HYDROCHLORIDE 0.4 MG/1
0.4 CAPSULE ORAL
Qty: 30 | Refills: 0 | Status: DISCONTINUED | COMMUNITY
Start: 2019-05-02 | End: 2023-02-27

## 2023-02-27 RX ORDER — METRONIDAZOLE 7.5 MG/G
0.75 GEL VAGINAL
Qty: 70 | Refills: 3 | Status: DISCONTINUED | COMMUNITY
Start: 2020-07-23 | End: 2023-02-27

## 2023-02-27 NOTE — HISTORY OF PRESENT ILLNESS
[de-identified] : 32 year old F with PMH vitamin D deficiency presents for initial CPE. Pt denies CP/SOB, fever/chills, n/v/d/c.

## 2023-02-27 NOTE — PLAN
[FreeTextEntry1] : Routine blood work and urine today. Vitamin D sent. Pt advised to sign up for [facility] portal to review labs and communicate any questions or concerns directly. Yearly physical and return as needed for illness, medication refills, and new or existing complaints.

## 2023-02-27 NOTE — HEALTH RISK ASSESSMENT
[Good] : ~his/her~  mood as  good [Yes] : Yes [2 - 4 times a month (2 pts)] : 2-4 times a month (2 points) [1 or 2 (0 pts)] : 1 or 2 (0 points) [Never (0 pts)] : Never (0 points) [No] : In the past 12 months have you used drugs other than those required for medical reasons? No [0] : 2) Feeling down, depressed, or hopeless: Not at all (0) [PHQ-2 Negative - No further assessment needed] : PHQ-2 Negative - No further assessment needed [Audit-CScore] : 2 [[id number]] : 0 [Patient reported PAP Smear was normal] : Patient reported PAP Smear was normal [None] : None [With Family] : lives with family [Employed] : employed [] :  [# Of Children ___] : has [unfilled] children [PapSmearDate] : 11/22

## 2023-02-27 NOTE — REVIEW OF SYSTEMS
None [Abdominal Pain] : no abdominal pain [Nausea] : no nausea [Constipation] : no constipation [Diarrhea] : diarrhea [Vomiting] : no vomiting [Heartburn] : no heartburn [Melena] : no melena [Negative] : Heme/Lymph [FreeTextEntry7] : hemorrhoid

## 2023-02-28 ENCOUNTER — TRANSCRIPTION ENCOUNTER (OUTPATIENT)
Age: 33
End: 2023-02-28

## 2023-02-28 LAB
25(OH)D3 SERPL-MCNC: 34.6 NG/ML
ALBUMIN SERPL ELPH-MCNC: 4.5 G/DL
ALP BLD-CCNC: 103 U/L
ALT SERPL-CCNC: 13 U/L
ANION GAP SERPL CALC-SCNC: 12 MMOL/L
APPEARANCE: ABNORMAL
AST SERPL-CCNC: 15 U/L
BACTERIA: NEGATIVE
BASOPHILS # BLD AUTO: 0.03 K/UL
BASOPHILS NFR BLD AUTO: 0.5 %
BILIRUB SERPL-MCNC: 0.7 MG/DL
BILIRUBIN URINE: NEGATIVE
BLOOD URINE: ABNORMAL
BUN SERPL-MCNC: 12 MG/DL
CALCIUM SERPL-MCNC: 9.1 MG/DL
CHLORIDE SERPL-SCNC: 104 MMOL/L
CHOLEST SERPL-MCNC: 151 MG/DL
CO2 SERPL-SCNC: 24 MMOL/L
COLOR: YELLOW
CREAT SERPL-MCNC: 0.67 MG/DL
EGFR: 118 ML/MIN/1.73M2
EOSINOPHIL # BLD AUTO: 0.05 K/UL
EOSINOPHIL NFR BLD AUTO: 0.8 %
ESTIMATED AVERAGE GLUCOSE: 100 MG/DL
GLUCOSE QUALITATIVE U: NEGATIVE
GLUCOSE SERPL-MCNC: 76 MG/DL
HBA1C MFR BLD HPLC: 5.1 %
HCT VFR BLD CALC: 40.5 %
HDLC SERPL-MCNC: 47 MG/DL
HGB BLD-MCNC: 13 G/DL
HYALINE CASTS: 0 /LPF
IMM GRANULOCYTES NFR BLD AUTO: 0.2 %
KETONES URINE: NEGATIVE
LDLC SERPL CALC-MCNC: 94 MG/DL
LEUKOCYTE ESTERASE URINE: ABNORMAL
LYMPHOCYTES # BLD AUTO: 2.33 K/UL
LYMPHOCYTES NFR BLD AUTO: 39.6 %
MAN DIFF?: NORMAL
MCHC RBC-ENTMCNC: 29.8 PG
MCHC RBC-ENTMCNC: 32.1 GM/DL
MCV RBC AUTO: 92.9 FL
MICROSCOPIC-UA: NORMAL
MONOCYTES # BLD AUTO: 0.32 K/UL
MONOCYTES NFR BLD AUTO: 5.4 %
NEUTROPHILS # BLD AUTO: 3.15 K/UL
NEUTROPHILS NFR BLD AUTO: 53.5 %
NITRITE URINE: NEGATIVE
NONHDLC SERPL-MCNC: 104 MG/DL
PH URINE: 6
PLATELET # BLD AUTO: 229 K/UL
POTASSIUM SERPL-SCNC: 4.5 MMOL/L
PROT SERPL-MCNC: 6.6 G/DL
PROTEIN URINE: NORMAL
RBC # BLD: 4.36 M/UL
RBC # FLD: 12.2 %
RED BLOOD CELLS URINE: 2 /HPF
SODIUM SERPL-SCNC: 140 MMOL/L
SPECIFIC GRAVITY URINE: 1.03
SQUAMOUS EPITHELIAL CELLS: 4 /HPF
TRIGL SERPL-MCNC: 53 MG/DL
TSH SERPL-ACNC: 2 UIU/ML
UROBILINOGEN URINE: NORMAL
WBC # FLD AUTO: 5.89 K/UL
WHITE BLOOD CELLS URINE: 16 /HPF

## 2023-03-01 ENCOUNTER — TRANSCRIPTION ENCOUNTER (OUTPATIENT)
Age: 33
End: 2023-03-01

## 2023-04-01 ENCOUNTER — EMERGENCY (EMERGENCY)
Facility: HOSPITAL | Age: 33
LOS: 1 days | Discharge: HOME | End: 2023-04-01
Attending: EMERGENCY MEDICINE
Payer: COMMERCIAL

## 2023-04-01 ENCOUNTER — NON-APPOINTMENT (OUTPATIENT)
Age: 33
End: 2023-04-01

## 2023-04-01 VITALS
DIASTOLIC BLOOD PRESSURE: 70 MMHG | RESPIRATION RATE: 18 BRPM | TEMPERATURE: 98 F | SYSTOLIC BLOOD PRESSURE: 116 MMHG | OXYGEN SATURATION: 99 % | HEART RATE: 66 BPM

## 2023-04-01 VITALS
HEIGHT: 63 IN | OXYGEN SATURATION: 99 % | HEART RATE: 66 BPM | SYSTOLIC BLOOD PRESSURE: 112 MMHG | RESPIRATION RATE: 22 BRPM | WEIGHT: 154.98 LBS | DIASTOLIC BLOOD PRESSURE: 96 MMHG

## 2023-04-01 DIAGNOSIS — Z98.89 OTHER SPECIFIED POSTPROCEDURAL STATES: Chronic | ICD-10-CM

## 2023-04-01 LAB
ALBUMIN SERPL ELPH-MCNC: 4.2 G/DL — SIGNIFICANT CHANGE UP (ref 3.3–5)
ALP SERPL-CCNC: 105 U/L — SIGNIFICANT CHANGE UP (ref 40–120)
ALT FLD-CCNC: 17 U/L — SIGNIFICANT CHANGE UP (ref 10–45)
ANION GAP SERPL CALC-SCNC: 12 MMOL/L — SIGNIFICANT CHANGE UP (ref 5–17)
APPEARANCE UR: ABNORMAL
AST SERPL-CCNC: 14 U/L — SIGNIFICANT CHANGE UP (ref 10–40)
BACTERIA # UR AUTO: NEGATIVE — SIGNIFICANT CHANGE UP
BASOPHILS # BLD AUTO: 0.05 K/UL — SIGNIFICANT CHANGE UP (ref 0–0.2)
BASOPHILS NFR BLD AUTO: 0.3 % — SIGNIFICANT CHANGE UP (ref 0–2)
BILIRUB SERPL-MCNC: 0.5 MG/DL — SIGNIFICANT CHANGE UP (ref 0.2–1.2)
BILIRUB UR-MCNC: NEGATIVE — SIGNIFICANT CHANGE UP
BUN SERPL-MCNC: 14 MG/DL — SIGNIFICANT CHANGE UP (ref 7–23)
CALCIUM SERPL-MCNC: 8.6 MG/DL — SIGNIFICANT CHANGE UP (ref 8.4–10.5)
CHLORIDE SERPL-SCNC: 104 MMOL/L — SIGNIFICANT CHANGE UP (ref 96–108)
CO2 SERPL-SCNC: 22 MMOL/L — SIGNIFICANT CHANGE UP (ref 22–31)
COLOR SPEC: ABNORMAL
CREAT SERPL-MCNC: 0.61 MG/DL — SIGNIFICANT CHANGE UP (ref 0.5–1.3)
DIFF PNL FLD: ABNORMAL
EGFR: 120 ML/MIN/1.73M2 — SIGNIFICANT CHANGE UP
EOSINOPHIL # BLD AUTO: 0.01 K/UL — SIGNIFICANT CHANGE UP (ref 0–0.5)
EOSINOPHIL NFR BLD AUTO: 0.1 % — SIGNIFICANT CHANGE UP (ref 0–6)
EPI CELLS # UR: 3 /HPF — SIGNIFICANT CHANGE UP
GLUCOSE SERPL-MCNC: 110 MG/DL — HIGH (ref 70–99)
GLUCOSE UR QL: NEGATIVE — SIGNIFICANT CHANGE UP
HCT VFR BLD CALC: 39.3 % — SIGNIFICANT CHANGE UP (ref 34.5–45)
HGB BLD-MCNC: 12.3 G/DL — SIGNIFICANT CHANGE UP (ref 11.5–15.5)
HYALINE CASTS # UR AUTO: 2 /LPF — SIGNIFICANT CHANGE UP (ref 0–2)
IMM GRANULOCYTES NFR BLD AUTO: 0.5 % — SIGNIFICANT CHANGE UP (ref 0–0.9)
KETONES UR-MCNC: ABNORMAL
LEUKOCYTE ESTERASE UR-ACNC: NEGATIVE — SIGNIFICANT CHANGE UP
LIDOCAIN IGE QN: 19 U/L — SIGNIFICANT CHANGE UP (ref 7–60)
LYMPHOCYTES # BLD AUTO: 1.24 K/UL — SIGNIFICANT CHANGE UP (ref 1–3.3)
LYMPHOCYTES # BLD AUTO: 8.6 % — LOW (ref 13–44)
MCHC RBC-ENTMCNC: 28.3 PG — SIGNIFICANT CHANGE UP (ref 27–34)
MCHC RBC-ENTMCNC: 31.3 GM/DL — LOW (ref 32–36)
MCV RBC AUTO: 90.6 FL — SIGNIFICANT CHANGE UP (ref 80–100)
MONOCYTES # BLD AUTO: 0.39 K/UL — SIGNIFICANT CHANGE UP (ref 0–0.9)
MONOCYTES NFR BLD AUTO: 2.7 % — SIGNIFICANT CHANGE UP (ref 2–14)
NEUTROPHILS # BLD AUTO: 12.58 K/UL — HIGH (ref 1.8–7.4)
NEUTROPHILS NFR BLD AUTO: 87.8 % — HIGH (ref 43–77)
NITRITE UR-MCNC: NEGATIVE — SIGNIFICANT CHANGE UP
NRBC # BLD: 0 /100 WBCS — SIGNIFICANT CHANGE UP (ref 0–0)
PH UR: 7 — SIGNIFICANT CHANGE UP (ref 5–8)
PLATELET # BLD AUTO: 223 K/UL — SIGNIFICANT CHANGE UP (ref 150–400)
POTASSIUM SERPL-MCNC: 3.6 MMOL/L — SIGNIFICANT CHANGE UP (ref 3.5–5.3)
POTASSIUM SERPL-SCNC: 3.6 MMOL/L — SIGNIFICANT CHANGE UP (ref 3.5–5.3)
PROT SERPL-MCNC: 6.7 G/DL — SIGNIFICANT CHANGE UP (ref 6–8.3)
PROT UR-MCNC: ABNORMAL
RBC # BLD: 4.34 M/UL — SIGNIFICANT CHANGE UP (ref 3.8–5.2)
RBC # FLD: 11.9 % — SIGNIFICANT CHANGE UP (ref 10.3–14.5)
RBC CASTS # UR COMP ASSIST: 398 /HPF — HIGH (ref 0–4)
SODIUM SERPL-SCNC: 138 MMOL/L — SIGNIFICANT CHANGE UP (ref 135–145)
SP GR SPEC: 1.02 — SIGNIFICANT CHANGE UP (ref 1.01–1.02)
UROBILINOGEN FLD QL: NEGATIVE — SIGNIFICANT CHANGE UP
WBC # BLD: 14.34 K/UL — HIGH (ref 3.8–10.5)
WBC # FLD AUTO: 14.34 K/UL — HIGH (ref 3.8–10.5)
WBC UR QL: 14 /HPF — HIGH (ref 0–5)

## 2023-04-01 PROCEDURE — 83690 ASSAY OF LIPASE: CPT

## 2023-04-01 PROCEDURE — 76775 US EXAM ABDO BACK WALL LIM: CPT | Mod: 26

## 2023-04-01 PROCEDURE — 99285 EMERGENCY DEPT VISIT HI MDM: CPT

## 2023-04-01 PROCEDURE — 76775 US EXAM ABDO BACK WALL LIM: CPT

## 2023-04-01 PROCEDURE — 36415 COLL VENOUS BLD VENIPUNCTURE: CPT

## 2023-04-01 PROCEDURE — 96375 TX/PRO/DX INJ NEW DRUG ADDON: CPT

## 2023-04-01 PROCEDURE — 84702 CHORIONIC GONADOTROPIN TEST: CPT

## 2023-04-01 PROCEDURE — 87086 URINE CULTURE/COLONY COUNT: CPT

## 2023-04-01 PROCEDURE — 85025 COMPLETE CBC W/AUTO DIFF WBC: CPT

## 2023-04-01 PROCEDURE — 80053 COMPREHEN METABOLIC PANEL: CPT

## 2023-04-01 PROCEDURE — 99284 EMERGENCY DEPT VISIT MOD MDM: CPT | Mod: 25

## 2023-04-01 PROCEDURE — 96374 THER/PROPH/DIAG INJ IV PUSH: CPT

## 2023-04-01 PROCEDURE — 81001 URINALYSIS AUTO W/SCOPE: CPT

## 2023-04-01 RX ORDER — TAMSULOSIN HYDROCHLORIDE 0.4 MG/1
1 CAPSULE ORAL
Qty: 14 | Refills: 0
Start: 2023-04-01 | End: 2023-04-14

## 2023-04-01 RX ORDER — ACETAMINOPHEN 500 MG
975 TABLET ORAL ONCE
Refills: 0 | Status: DISCONTINUED | OUTPATIENT
Start: 2023-04-01 | End: 2023-04-01

## 2023-04-01 RX ORDER — MORPHINE SULFATE 50 MG/1
4 CAPSULE, EXTENDED RELEASE ORAL ONCE
Refills: 0 | Status: DISCONTINUED | OUTPATIENT
Start: 2023-04-01 | End: 2023-04-01

## 2023-04-01 RX ORDER — MORPHINE SULFATE 50 MG/1
2 CAPSULE, EXTENDED RELEASE ORAL ONCE
Refills: 0 | Status: DISCONTINUED | OUTPATIENT
Start: 2023-04-01 | End: 2023-04-01

## 2023-04-01 RX ORDER — ONDANSETRON 8 MG/1
4 TABLET, FILM COATED ORAL ONCE
Refills: 0 | Status: COMPLETED | OUTPATIENT
Start: 2023-04-01 | End: 2023-04-01

## 2023-04-01 RX ORDER — OXYCODONE HYDROCHLORIDE 5 MG/1
1 TABLET ORAL
Qty: 8 | Refills: 0
Start: 2023-04-01

## 2023-04-01 RX ORDER — KETOROLAC TROMETHAMINE 30 MG/ML
15 SYRINGE (ML) INJECTION ONCE
Refills: 0 | Status: DISCONTINUED | OUTPATIENT
Start: 2023-04-01 | End: 2023-04-01

## 2023-04-01 RX ORDER — ONDANSETRON 8 MG/1
1 TABLET, FILM COATED ORAL
Qty: 9 | Refills: 0
Start: 2023-04-01 | End: 2023-04-03

## 2023-04-01 RX ORDER — SODIUM CHLORIDE 9 MG/ML
1000 INJECTION INTRAMUSCULAR; INTRAVENOUS; SUBCUTANEOUS ONCE
Refills: 0 | Status: COMPLETED | OUTPATIENT
Start: 2023-04-01 | End: 2023-04-01

## 2023-04-01 RX ORDER — ACETAMINOPHEN 500 MG
1000 TABLET ORAL ONCE
Refills: 0 | Status: COMPLETED | OUTPATIENT
Start: 2023-04-01 | End: 2023-04-01

## 2023-04-01 RX ADMIN — MORPHINE SULFATE 4 MILLIGRAM(S): 50 CAPSULE, EXTENDED RELEASE ORAL at 15:35

## 2023-04-01 RX ADMIN — Medication 1000 MILLIGRAM(S): at 15:35

## 2023-04-01 RX ADMIN — SODIUM CHLORIDE 1000 MILLILITER(S): 9 INJECTION INTRAMUSCULAR; INTRAVENOUS; SUBCUTANEOUS at 15:07

## 2023-04-01 RX ADMIN — ONDANSETRON 4 MILLIGRAM(S): 8 TABLET, FILM COATED ORAL at 15:07

## 2023-04-01 RX ADMIN — Medication 400 MILLIGRAM(S): at 15:12

## 2023-04-01 RX ADMIN — Medication 15 MILLIGRAM(S): at 15:08

## 2023-04-01 RX ADMIN — Medication 15 MILLIGRAM(S): at 15:35

## 2023-04-01 RX ADMIN — MORPHINE SULFATE 4 MILLIGRAM(S): 50 CAPSULE, EXTENDED RELEASE ORAL at 15:11

## 2023-04-01 NOTE — ED PROVIDER NOTE - SHIFT CHANGE DETAILS
Pending US, labs and disposition Trilobed Flap Text: Because of the size and full-thickness nature of the defect, and to maintain form and function of the nose, and to avoid distortion of the nearby nasal tip and ala, a trilobed transposition flap was planned. After prep and local anesthesia, the trilobe was carefully incised with a Burow's triangle oriented superiorly. The flap was raised and the wound edges were undermined in the submuscular plane to the nasofacial junction. After hemostasis, the flaps were transposed into the defect and sutured in a layered fashion

## 2023-04-01 NOTE — ED PROVIDER NOTE - CARE PLAN
Assessment and plan of treatment:	ayden   1 Principal Discharge DX:	Kidney stone  Assessment and plan of treatment:	mdm

## 2023-04-01 NOTE — ED PROVIDER NOTE - NS ED ROS FT
REVIEW OF SYSTEMS:    CONSTITUTIONAL: No weakness, fevers but has chills  EYES/ENT: No visual changes;  No vertigo or throat pain   NECK: No pain or stiffness  RESPIRATORY: No cough, wheezing, hemoptysis; No shortness of breath  CARDIOVASCULAR: No chest pain or palpitations  GASTROINTESTINAL: N/V (multipole episodes), No abdominal or epigastric pain. hematemesis; No diarrhea or constipation. No melena or hematochezia.  GENITOURINARY: +hematuria, R flank pain, No dysuria, frequency   NEUROLOGICAL: No numbness or weakness  SKIN: No itching, rashes

## 2023-04-01 NOTE — ED PROVIDER NOTE - OBJECTIVE STATEMENT
33yo F with PMH of 2 kidney stones presents with acute R flank pain and hematouria today. Patient notes having history of recurrent kidney stones and indicates pain feels similar to current episode. Notes pain is in R flank non radiating and sharp in nature now and had hematouria this morning but denies dysuria or fever. NOtes having chills however and having multiple episodes of emesis (~10) and feels dehydrated. Patient also took oxy 5mg at 12 indicates mild symptomatic relief but no signfanct change. Patient notes is currently breast feeding and notes is sexually active without intercourse LMP 3wk ago and does not use protection. 31yo F with PMH of 2 kidney stones presents with acute R flank pain and hematouria today. That feels similar nature to prior episodes of nephrolithiasis (though she did not have hematuria on prior episodes).    Patient notes having history of recurrent kidney stones and indicates pain feels similar to current episode. Notes pain is in R flank non radiating and sharp in nature now and had hematouria this morning but denies dysuria or fever. NOtes having chills however and having multiple episodes of emesis (~10) and feels dehydrated, exacerbated by breastfeeding. Patient also took oxy 5mg at 12 indicates mild symptomatic relief but no signfanct change. Patient notes is currently breast feeding and notes is sexually active without intercourse LMP 3wk ago and does not use protection. No vaginal bleeding or discharge.

## 2023-04-01 NOTE — ED PROVIDER NOTE - PROGRESS NOTE DETAILS
US shows mild hydro. labs reassuring. no UTI on urine.  pt feels better.  Patient was re-evaluated and doing well. Results, including any incidental findings, were discussed. Return precautions and follow up were discussed. Patient verbalized understanding.

## 2023-04-01 NOTE — ED PROVIDER NOTE - NSFOLLOWUPINSTRUCTIONS_ED_ALL_ED_FT
You were seen in the ED for flank pain.      You likely have a kidney stone.  Your ultrasound shows mild hydronephrosis.      Most people are able to pass kidney stones on their own within a few days.  Please take tamsulosin as prescribed.      For pain, take ibuprofen and Tylenol as per the over-the-counter bottle instructions.  For severe pain only, take oxycodone as prescribed.      For nausea and vomiting, take Zofran as prescribed.      Follow-up with your urologist in the next 3 to 4 days.  Return to the ER for any new or worsening symptoms including fevers, chills, abdominal pain, nausea, vomiting, or any other worsening symptoms.

## 2023-04-01 NOTE — ED PROVIDER NOTE - NSFOLLOWUPCLINICS_GEN_ALL_ED_FT
[facility]  [facility]  [address] [place] [address], [place] [address]  Phone: [phone]  Fax:

## 2023-04-01 NOTE — ED PROVIDER NOTE - PHYSICAL EXAMINATION
VITALS:   T(C): --  HR: 66 (04-01-23 @ 14:07) (66 - 66)  BP: 112/96 (04-01-23 @ 14:07) (112/96 - 112/96)  RR: 22 (04-01-23 @ 14:07) (22 - 22)  SpO2: 99% (04-01-23 @ 14:07) (99% - 99%)    GENERAL: NAD, lying in bed comfortably but in pain  HEAD:  Atraumatic, Normocephalic  EYES: EOMI, PERRLA, conjunctiva and sclera clear  ENT: dry mucous membranes  NECK: Supple, No JVD  CHEST/LUNG: Clear to auscultation bilaterally; No rales, rhonchi, wheezing, or rubs. Unlabored respirations  HEART: Regular rate and rhythm; No murmurs rubs or gallops  Subclavian skin tenting:  ABDOMEN: BSx4; Soft, nontender, nondistended  : R CVA tendernesss and erythema in flank region  EXTREMITIES:  2+ radial pulses, 2+ dorsalis pedis, brisk capillary refill. No clubbing, cyanosis, or edema  NERVOUS SYSTEM:  A&Ox3, no gross lateralizing deficits   SKIN: No rashes or lesions (outside eryutehma noted for )

## 2023-04-01 NOTE — ED PROVIDER NOTE - CLINICAL SUMMARY MEDICAL DECISION MAKING FREE TEXT BOX
33yo F with PMH of recurrent kidney stones presents for acute R flank pain and hematouria today. Patient notes signfinant emesis with dehydration of dry lips, alongside R flank pain, hematouria, and chills but denies fever, diarrhea, constipation. VSS. PE positive for R flank tenderness and erythema.    Diff: nephrolithiasis highest differential. Potential infectious cause UTI/pyelo in setting of chills and hematouria. Unlikely but considered ectopic given sexual history.     Plan: CBC,CMP,UA,UC, IVF bolus 1L NS, pain medication 33yo F with PMH of recurrent kidney stones presents for acute R flank pain and hematouria today. Patient notes significant emesis with dehydration of dry lips, alongside R flank pain, hematouria, and chills but denies fever, diarrhea, constipation. VSS. PE positive for R flank tenderness    Diff: nephrolithiasis highest differential. Potential infectious cause UTI/pyelo in setting of chills and hematouria. Unlikely but considered ectopic given sexual history.     Plan: CBC,CMP,UA,UC, hcg, IVF bolus 1L NS, pain medication

## 2023-04-01 NOTE — ED PROVIDER NOTE - ATTENDING CONTRIBUTION TO CARE
33yo F with PMH of recurrent kidney stones presents for acute R flank pain and hematuria today. DDx includes but not limited to: pyleo (no evidence on exam), biliary colic, renal colic, nephrolithiasis. History highly favors nephrolithiasis - given age, US ordered and discussed risk/benefit of US vs. CT and patient amenable as CT would not likely  unless moderate-severe nephrolithaisis. Will give parenteral pain meds, IVF and antiemetics, check labs and reassess.    edited by Myriam Bermudez DO - attending physician.   Please see progress notes for status/labs/consult updates and ED course after initial presentation.

## 2023-04-01 NOTE — ED ADULT NURSE NOTE - OBJECTIVE STATEMENT
32 year old female patient presents to ED c/o R flank pain since this morning with nausea and vomiting, and hematuria . Patient states it feels like previous kidney stones. Patient took 5mg oxycodone at 12pm and vomited shortly after. Patient pale, diaphoretic and reporting intermittent 10/10 R flank pain. Patient aware of plan of care for monitoring and US. Family at bedside

## 2023-04-01 NOTE — ED PROVIDER NOTE - PATIENT PORTAL LINK FT
You can access the FollowMyHealth Patient Portal offered by [facility] by registering at the following website: [email or web address]. By joining [facility]’s FollowMyHealth portal, you will also be able to view your health information using other applications (apps) compatible with our system.

## 2023-04-03 LAB
CULTURE RESULTS: SIGNIFICANT CHANGE UP
SPECIMEN SOURCE: SIGNIFICANT CHANGE UP

## 2023-06-09 ENCOUNTER — OUTPATIENT (OUTPATIENT)
Dept: OUTPATIENT SERVICES | Facility: HOSPITAL | Age: 33
LOS: 1 days | End: 2023-06-09
Payer: COMMERCIAL

## 2023-06-09 VITALS
SYSTOLIC BLOOD PRESSURE: 105 MMHG | WEIGHT: 151.9 LBS | HEART RATE: 72 BPM | HEIGHT: 63 IN | RESPIRATION RATE: 16 BRPM | DIASTOLIC BLOOD PRESSURE: 73 MMHG | TEMPERATURE: 98 F | OXYGEN SATURATION: 99 %

## 2023-06-09 DIAGNOSIS — O02.1 MISSED ABORTION: ICD-10-CM

## 2023-06-09 DIAGNOSIS — Z98.89 OTHER SPECIFIED POSTPROCEDURAL STATES: Chronic | ICD-10-CM

## 2023-06-09 DIAGNOSIS — Z87.442 PERSONAL HISTORY OF URINARY CALCULI: Chronic | ICD-10-CM

## 2023-06-09 DIAGNOSIS — Z01.818 ENCOUNTER FOR OTHER PREPROCEDURAL EXAMINATION: ICD-10-CM

## 2023-06-09 DIAGNOSIS — Z98.891 HISTORY OF UTERINE SCAR FROM PREVIOUS SURGERY: Chronic | ICD-10-CM

## 2023-06-09 DIAGNOSIS — Z98.890 OTHER SPECIFIED POSTPROCEDURAL STATES: Chronic | ICD-10-CM

## 2023-06-09 LAB
BLD GP AB SCN SERPL QL: NEGATIVE — SIGNIFICANT CHANGE UP
HCT VFR BLD CALC: 39 % — SIGNIFICANT CHANGE UP (ref 34.5–45)
HGB BLD-MCNC: 12.8 G/DL — SIGNIFICANT CHANGE UP (ref 11.5–15.5)
MCHC RBC-ENTMCNC: 29.4 PG — SIGNIFICANT CHANGE UP (ref 27–34)
MCHC RBC-ENTMCNC: 32.8 GM/DL — SIGNIFICANT CHANGE UP (ref 32–36)
MCV RBC AUTO: 89.7 FL — SIGNIFICANT CHANGE UP (ref 80–100)
NRBC # BLD: 0 /100 WBCS — SIGNIFICANT CHANGE UP (ref 0–0)
PLATELET # BLD AUTO: 254 K/UL — SIGNIFICANT CHANGE UP (ref 150–400)
RBC # BLD: 4.35 M/UL — SIGNIFICANT CHANGE UP (ref 3.8–5.2)
RBC # FLD: 12 % — SIGNIFICANT CHANGE UP (ref 10.3–14.5)
RH IG SCN BLD-IMP: POSITIVE — SIGNIFICANT CHANGE UP
WBC # BLD: 6.82 K/UL — SIGNIFICANT CHANGE UP (ref 3.8–10.5)
WBC # FLD AUTO: 6.82 K/UL — SIGNIFICANT CHANGE UP (ref 3.8–10.5)

## 2023-06-09 PROCEDURE — 85027 COMPLETE CBC AUTOMATED: CPT

## 2023-06-09 PROCEDURE — G0463: CPT

## 2023-06-09 PROCEDURE — 86900 BLOOD TYPING SEROLOGIC ABO: CPT

## 2023-06-09 PROCEDURE — 86901 BLOOD TYPING SEROLOGIC RH(D): CPT

## 2023-06-09 PROCEDURE — 86850 RBC ANTIBODY SCREEN: CPT

## 2023-06-09 RX ORDER — FERROUS SULFATE 325(65) MG
1 TABLET ORAL
Qty: 0 | Refills: 0 | DISCHARGE

## 2023-06-09 RX ORDER — LIDOCAINE HCL 20 MG/ML
0.2 VIAL (ML) INJECTION ONCE
Refills: 0 | Status: DISCONTINUED | OUTPATIENT
Start: 2023-06-13 | End: 2023-06-27

## 2023-06-09 RX ORDER — SODIUM CHLORIDE 9 MG/ML
1000 INJECTION, SOLUTION INTRAVENOUS
Refills: 0 | Status: DISCONTINUED | OUTPATIENT
Start: 2023-06-13 | End: 2023-06-27

## 2023-06-09 NOTE — H&P PST ADULT - ASSESSMENT
Airway : no airway abnormalities , denies prior anesthesia complications   Mallampati : II  Denies loose teeth     Harry abrasion risk : Denies

## 2023-06-09 NOTE — H&P PST ADULT - NSICDXPASTMEDICALHX_GEN_ALL_CORE_FT
PAST MEDICAL HISTORY:  History of COVID-19     History of deviated nasal septum     History of nephrolithiasis     Missed ab

## 2023-06-09 NOTE — H&P PST ADULT - PROBLEM SELECTOR PLAN 1
D&C for missed ab  PST instructions provided, patient verbalized understanding   CBC, T&S collected and sent   Last PCP note on chart / [employer]

## 2023-06-09 NOTE — H&P PST ADULT - NSICDXFAMILYHX_GEN_ALL_CORE_FT
FAMILY HISTORY:  Family history of kidney stones    Father  Still living? Yes, Estimated age: 51-60  Family history of hypertension in father, Age at diagnosis: Age Unknown

## 2023-06-09 NOTE — H&P PST ADULT - NSICDXPASTSURGICALHX_GEN_ALL_CORE_FT
PAST SURGICAL HISTORY:  History of      History of nasal surgery     S/P nasal septoplasty turbinectomy (2015)

## 2023-06-09 NOTE — H&P PST ADULT - HISTORY OF PRESENT ILLNESS
32 yr old F with history of deviated nasal septum , septoplasty 6/3/2015 and septoplasty revision 16 recovered well, denies h/o anesthesia complications. Patient reports passing kidney stone 3/2023, has family history of kidney stones, denies sx interventions in the past. Patient is  about 9 weeks of gestation, LMP 2023. Presents to PST for scheduled D&C for missed AB on 2023. Patient reports pelvic discomfort and  intermittent vaginal spotting. Denies fever, chills, no acute complaints. History of Covid 2021 and 2022 , not vaccinated. Denies recent Covid exposure.

## 2023-06-09 NOTE — H&P PST ADULT - PRIMARY CARE PROVIDER
prior PCP Dr. Nicholas [phone] , new for now  Dr. Jeremy Colbert ( [facility] ) annual visit 2/27/2023 ( [facility] )

## 2023-06-12 ENCOUNTER — TRANSCRIPTION ENCOUNTER (OUTPATIENT)
Age: 33
End: 2023-06-12

## 2023-06-13 ENCOUNTER — OUTPATIENT (OUTPATIENT)
Dept: OUTPATIENT SERVICES | Facility: HOSPITAL | Age: 33
LOS: 1 days | End: 2023-06-13
Payer: COMMERCIAL

## 2023-06-13 ENCOUNTER — TRANSCRIPTION ENCOUNTER (OUTPATIENT)
Age: 33
End: 2023-06-13

## 2023-06-13 VITALS
HEART RATE: 63 BPM | DIASTOLIC BLOOD PRESSURE: 60 MMHG | SYSTOLIC BLOOD PRESSURE: 104 MMHG | RESPIRATION RATE: 14 BRPM | OXYGEN SATURATION: 99 %

## 2023-06-13 VITALS
HEIGHT: 63 IN | DIASTOLIC BLOOD PRESSURE: 66 MMHG | SYSTOLIC BLOOD PRESSURE: 101 MMHG | TEMPERATURE: 97 F | OXYGEN SATURATION: 100 % | HEART RATE: 72 BPM | WEIGHT: 151.9 LBS | RESPIRATION RATE: 16 BRPM

## 2023-06-13 DIAGNOSIS — Z98.891 HISTORY OF UTERINE SCAR FROM PREVIOUS SURGERY: Chronic | ICD-10-CM

## 2023-06-13 DIAGNOSIS — Z98.89 OTHER SPECIFIED POSTPROCEDURAL STATES: Chronic | ICD-10-CM

## 2023-06-13 DIAGNOSIS — Z98.890 OTHER SPECIFIED POSTPROCEDURAL STATES: Chronic | ICD-10-CM

## 2023-06-13 DIAGNOSIS — O02.1 MISSED ABORTION: ICD-10-CM

## 2023-06-13 LAB
BLD GP AB SCN SERPL QL: NEGATIVE — SIGNIFICANT CHANGE UP
RH IG SCN BLD-IMP: POSITIVE — SIGNIFICANT CHANGE UP

## 2023-06-13 PROCEDURE — 88305 TISSUE EXAM BY PATHOLOGIST: CPT

## 2023-06-13 PROCEDURE — 86850 RBC ANTIBODY SCREEN: CPT

## 2023-06-13 PROCEDURE — 59820 CARE OF MISCARRIAGE: CPT

## 2023-06-13 PROCEDURE — 86901 BLOOD TYPING SEROLOGIC RH(D): CPT

## 2023-06-13 PROCEDURE — 86900 BLOOD TYPING SEROLOGIC ABO: CPT

## 2023-06-13 PROCEDURE — 88305 TISSUE EXAM BY PATHOLOGIST: CPT | Mod: 26

## 2023-06-13 RX ORDER — OXYCODONE HYDROCHLORIDE 5 MG/1
5 TABLET ORAL ONCE
Refills: 0 | Status: DISCONTINUED | OUTPATIENT
Start: 2023-06-13 | End: 2023-06-13

## 2023-06-13 RX ORDER — ACETAMINOPHEN 500 MG
3 TABLET ORAL
Qty: 0 | Refills: 0 | DISCHARGE

## 2023-06-13 RX ORDER — ONDANSETRON 8 MG/1
4 TABLET, FILM COATED ORAL ONCE
Refills: 0 | Status: DISCONTINUED | OUTPATIENT
Start: 2023-06-13 | End: 2023-06-27

## 2023-06-13 RX ORDER — IBUPROFEN 200 MG
1 TABLET ORAL
Qty: 0 | Refills: 0 | DISCHARGE

## 2023-06-13 RX ORDER — DIPHENHYDRAMINE HCL 50 MG
12.5 CAPSULE ORAL ONCE
Refills: 0 | Status: DISCONTINUED | OUTPATIENT
Start: 2023-06-13 | End: 2023-06-27

## 2023-06-13 RX ADMIN — SODIUM CHLORIDE 100 MILLILITER(S): 9 INJECTION, SOLUTION INTRAVENOUS at 09:15

## 2023-06-13 NOTE — ASU DISCHARGE PLAN (ADULT/PEDIATRIC) - CARE PROVIDER_API CALL
Suzanna Yanez  Obstetrics and Gynecology  [address], [address]  [place] [address]  Phone: [phone]  Fax: [phone]  Established Patient  Follow Up Time: 2 weeks

## 2023-06-13 NOTE — ASU PATIENT PROFILE, ADULT - TOBACCO USE
DATE:  03/25/2019



SUBJECTIVE:  The patient is discharged.  She is out of the ICU.  She is

still having some black stool, but she denies any fever or chills.  She

denies any fresh bleeding.



PHYSICAL EXAMINATION:

VITAL SIGNS:  Blood pressure 143/71, pulse 58, respiratory rate 20,

temperature 97.6.

LUNGS:  Bilaterally clear.  No rales.  No rhonchi.

CARDIOVASCULAR SYSTEM:  S1, S2, plus S3 positive.

ABDOMEN:  Charles catheter with yellow urine.  Soft, nontender.



ASSESSMENT:

1.  Status post gastrointestinal bleed.

2.  Hepatic sarcoidosis with end-stage liver disease.

3.  Type 2 diabetes.

4.  Hypertension.



PLAN:  Taper prednisone, Solu-Medrol, IV hydration.  Monitor the patient.





__________________________________________

Nasir Dunbar MD





DD:  03/25/2019 23:59:22

DT:  03/26/2019 0:56:54

Job # [id number] Never smoker

## 2023-06-13 NOTE — ASU DISCHARGE PLAN (ADULT/PEDIATRIC) - ASU DC SPECIAL INSTRUCTIONSFT
Nothing per vagina for 2 weeks- no douching, tampons, sitz baths, sexual intercourse.  Call your doctor and go to the ER if you experience severe discomfort, chest pain, shortness of breath, fever greater than 100.4, of excessive vaginal bleeding greater than 1 pad/hr for 2 consecutive hours.  Take over the counter  medications for pain control as directed. Follow up with your doctor in 2 weeks.

## 2023-06-13 NOTE — ASU DISCHARGE PLAN (ADULT/PEDIATRIC) - NURSING INSTRUCTIONS
You received a dose of Tylenol today at 11 AM this morning. If needed, next dose time is 5 PM this evening. Do not exceed 4,000 mg of Tylenol in a 24 hour period. You received a dose of Toradol (motrin or ibuprofen) today at 11:30 AM this morning. If needed, next dose time is 5:30 PM this evening. Please eat before taking this medication.

## 2023-06-13 NOTE — BRIEF OPERATIVE NOTE - OPERATION/FINDINGS
EUA: Grossly normal appearing external genitalia, cervix, vagina  Ultrasound guidance used during duration of case, post-operative ultrasound showed thin endometrial stripe

## 2023-06-13 NOTE — BRIEF OPERATIVE NOTE - NSICDXBRIEFPROCEDURE_GEN_ALL_CORE_FT
PROCEDURES:  Dilation and curettage, uterus, using suction, with US guidance 13-Jun-2023 11:38:29  Catie German

## 2023-06-19 LAB — SURGICAL PATHOLOGY STUDY: SIGNIFICANT CHANGE UP

## 2023-08-01 NOTE — ED ADULT NURSE NOTE - NSIMPLEMENTINTERV_GEN_ALL_ED
Quality 226: Preventive Care And Screening: Tobacco Use: Screening And Cessation Intervention: Patient screened for tobacco use and is an ex/non-smoker Detail Level: Detailed Implemented All Universal Safety Interventions:  [place] to call system. Call bell, personal items and telephone within reach. Instruct patient to call for assistance. Room bathroom lighting operational. Non-slip footwear when patient is off stretcher. Physically safe environment: no spills, clutter or unnecessary equipment. Stretcher in lowest position, wheels locked, appropriate side rails in place.

## 2023-08-07 NOTE — OB PROVIDER TRIAGE NOTE - NS_FHRDECEL_OBGYN_ALL_OB
No Decelerations
No
Doxepin Pregnancy And Lactation Text: This medication is Pregnancy Category C and it isn't known if it is safe during pregnancy. It is also excreted in breast milk and breast feeding isn't recommended.

## 2023-09-01 NOTE — OB PROVIDER TRIAGE NOTE - HISTORY OF PRESENT ILLNESS
No care due was identified.  [facility] Embedded Care Due Messages. Reference number: [id number].   9/01/2023 8:35:27 AM CDT  
PT is requesting a med refill on medication = albuterol .083% 2.5 mg/3ml.  
32 y/o  @ 26w4d JOSH 22, fell on her lower back and buttock while walking down stair, slipped about 7-8 steps, landed on her buttock and elbows. denies trauma on her abd. reports +FM. denies abd pain, vaginal bleeding, contractions, or fluid leakage. denies dizziness, SOB, CP or palpitations.     PGYNHx: denies hx of fibroid or ovarian cysts  PMHx: Anemia  PSHx: nasal septoplasty ()  ALL: NKDA  SH: denies t/e/d, denies depression or anxiety

## 2023-11-24 PROBLEM — Z87.442 PERSONAL HISTORY OF URINARY CALCULI: Chronic | Status: ACTIVE | Noted: 2023-06-09

## 2023-11-24 PROBLEM — Z87.09 PERSONAL HISTORY OF OTHER DISEASES OF THE RESPIRATORY SYSTEM: Chronic | Status: ACTIVE | Noted: 2023-06-09

## 2023-11-24 PROBLEM — Z86.16 PERSONAL HISTORY OF COVID-19: Chronic | Status: ACTIVE | Noted: 2023-06-09

## 2023-11-24 PROBLEM — O02.1 MISSED ABORTION: Chronic | Status: ACTIVE | Noted: 2023-06-09

## 2023-12-06 ENCOUNTER — APPOINTMENT (OUTPATIENT)
Dept: MAMMOGRAPHY | Facility: CLINIC | Age: 33
End: 2023-12-06
Payer: COMMERCIAL

## 2023-12-06 ENCOUNTER — APPOINTMENT (OUTPATIENT)
Dept: ULTRASOUND IMAGING | Facility: CLINIC | Age: 33
End: 2023-12-06
Payer: COMMERCIAL

## 2023-12-06 PROCEDURE — 77067 SCR MAMMO BI INCL CAD: CPT

## 2023-12-06 PROCEDURE — 77063 BREAST TOMOSYNTHESIS BI: CPT

## 2023-12-06 PROCEDURE — 76641 ULTRASOUND BREAST COMPLETE: CPT | Mod: 50

## 2024-02-07 ENCOUNTER — APPOINTMENT (OUTPATIENT)
Dept: INTERNAL MEDICINE | Facility: CLINIC | Age: 34
End: 2024-02-07
Payer: COMMERCIAL

## 2024-02-07 VITALS
RESPIRATION RATE: 13 BRPM | SYSTOLIC BLOOD PRESSURE: 99 MMHG | OXYGEN SATURATION: 98 % | WEIGHT: 157 LBS | DIASTOLIC BLOOD PRESSURE: 69 MMHG | HEART RATE: 78 BPM | HEIGHT: 63 IN | TEMPERATURE: 98.3 F | BODY MASS INDEX: 27.82 KG/M2

## 2024-02-07 DIAGNOSIS — O03.9 COMPLETE OR UNSPECIFIED SPONTANEOUS ABORTION W/OUT COMPLICATION: ICD-10-CM

## 2024-02-07 DIAGNOSIS — Z00.00 ENCOUNTER FOR GENERAL ADULT MEDICAL EXAMINATION W/OUT ABNORMAL FINDINGS: ICD-10-CM

## 2024-02-07 DIAGNOSIS — E55.9 VITAMIN D DEFICIENCY, UNSPECIFIED: ICD-10-CM

## 2024-02-07 PROCEDURE — 99385 PREV VISIT NEW AGE 18-39: CPT | Mod: 25

## 2024-02-07 RX ORDER — ERGOCALCIFEROL 1.25 MG/1
1.25 MG CAPSULE, LIQUID FILLED ORAL
Qty: 12 | Refills: 1 | Status: DISCONTINUED | COMMUNITY
Start: 2018-08-18 | End: 2024-02-07

## 2024-02-07 NOTE — PHYSICAL EXAM
[No Acute Distress] : no acute distress [Well Nourished] : well nourished [Well Developed] : well developed [Well-Appearing] : well-appearing [Normal Sclera/Conjunctiva] : normal sclera/conjunctiva [PERRL] : pupils equal round and reactive to light [EOMI] : extraocular movements intact [Normal Outer Ear/Nose] : the outer ears and nose were normal in appearance [Normal Oropharynx] : the oropharynx was normal [Normal TMs] : both tympanic membranes were normal [No JVD] : no jugular venous distention [No Lymphadenopathy] : no lymphadenopathy [Supple] : supple [Thyroid Normal, No Nodules] : the thyroid was normal and there were no nodules present [No Respiratory Distress] : no respiratory distress  [No Accessory Muscle Use] : no accessory muscle use [Clear to Auscultation] : lungs were clear to auscultation bilaterally [Normal Rate] : normal rate  [Regular Rhythm] : with a regular rhythm [Normal S1, S2] : normal S1 and S2 [No Murmur] : no murmur heard [No Abdominal Bruit] : a ~M bruit was not heard ~T in the abdomen [No Varicosities] : no varicosities [No Edema] : there was no peripheral edema [No Palpable Aorta] : no palpable aorta [No Extremity Clubbing/Cyanosis] : no extremity clubbing/cyanosis [Soft] : abdomen soft [Non Tender] : non-tender [Non-distended] : non-distended [No Masses] : no abdominal mass palpated [Normal Bowel Sounds] : normal bowel sounds [Normal Posterior Cervical Nodes] : no posterior cervical lymphadenopathy [Normal Anterior Cervical Nodes] : no anterior cervical lymphadenopathy [No CVA Tenderness] : no CVA  tenderness [No Spinal Tenderness] : no spinal tenderness [No Joint Swelling] : no joint swelling [Grossly Normal Strength/Tone] : grossly normal strength/tone [No Rash] : no rash [Coordination Grossly Intact] : coordination grossly intact [No Focal Deficits] : no focal deficits [Normal Gait] : normal gait [Normal Affect] : the affect was normal [Normal Insight/Judgement] : insight and judgment were intact [Normal Mood] : the mood was normal [de-identified] : friendly [de-identified] : +PND [de-identified] : defer to GYN

## 2024-02-07 NOTE — HISTORY OF PRESENT ILLNESS
[FreeTextEntry1] : New pt [de-identified] : 33 year old F comes to establish medical care and for an annual exam. Previously followed by Dr. Jeremy Colbert. Last physical 1 yr ago.  She has no medical problems. She developed mild head cold symptoms this morning. Has only nasal congestion. No fever or chills. Her 18 month old son has a head cold. She had a miscarriage in 6/23. She is actively trying to get pregnant again. She would like to be screened for MTHFR mutation.

## 2024-02-07 NOTE — HEALTH RISK ASSESSMENT
[Good] : ~his/her~  mood as  good [Yes] : Yes [2 - 4 times a month (2 pts)] : 2-4 times a month (2 points) [1 or 2 (0 pts)] : 1 or 2 (0 points) [Never (0 pts)] : Never (0 points) [No] : In the past 12 months have you used drugs other than those required for medical reasons? No [0] : 2) Feeling down, depressed, or hopeless: Not at all (0) [PHQ-2 Negative - No further assessment needed] : PHQ-2 Negative - No further assessment needed [Patient reported PAP Smear was normal] : Patient reported PAP Smear was normal [None] : None [With Family] : lives with family [] :  [# Of Children ___] : has [unfilled] children [Sexually Active] : sexually active [Smoke Detector] : smoke detector [Carbon Monoxide Detector] : carbon monoxide detector [Seat Belt] :  uses seat belt [Sunscreen] : uses sunscreen [Never] : Never [Audit-CScore] : 2 [OQT2Teloc] : 0 [High Risk Behavior] : no high risk behavior [Reports changes in hearing] : Reports no changes in hearing [Reports changes in vision] : Reports no changes in vision [Reports changes in dental health] : Reports no changes in dental health [PapSmearDate] : 11/23

## 2024-02-07 NOTE — PLAN
[FreeTextEntry1] : Check routine labs. Lab slip provided.  Discussed diet, exercise, and weight maintenance. She has declined all vaccines. GYN exam UTD.  Recent miscarriage last yr. Screen for MTHFR mutation. Check vitamin D level. Continue supplement. Mild head cold sx - suggest OTC meds. Pt to notify on [facility] if develops worsening sx.   RTO in 1 yr for annual exam or earlier PRN for acute care.

## 2024-02-07 NOTE — REVIEW OF SYSTEMS
[Nasal Discharge] : nasal discharge [Negative] : Gastrointestinal [Earache] : no earache [Sore Throat] : no sore throat [Postnasal Drip] : no postnasal drip

## 2024-03-03 ENCOUNTER — TRANSCRIPTION ENCOUNTER (OUTPATIENT)
Age: 34
End: 2024-03-03

## 2024-03-03 LAB
25(OH)D3 SERPL-MCNC: 36.2 NG/ML
ALBUMIN SERPL ELPH-MCNC: 4.4 G/DL
ALP BLD-CCNC: 77 U/L
ALT SERPL-CCNC: 17 U/L
ANION GAP SERPL CALC-SCNC: 10 MMOL/L
APPEARANCE: CLEAR
AST SERPL-CCNC: 15 U/L
BACTERIA: ABNORMAL /HPF
BILIRUB SERPL-MCNC: 0.5 MG/DL
BILIRUBIN URINE: NEGATIVE
BLOOD URINE: ABNORMAL
BUN SERPL-MCNC: 15 MG/DL
CALCIUM SERPL-MCNC: 8.9 MG/DL
CAST: 0 /LPF
CHLORIDE SERPL-SCNC: 104 MMOL/L
CHOLEST SERPL-MCNC: 161 MG/DL
CO2 SERPL-SCNC: 24 MMOL/L
COLOR: YELLOW
CREAT SERPL-MCNC: 0.68 MG/DL
EGFR: 116 ML/MIN/1.73M2
EPITHELIAL CELLS: 11 /HPF
ESTIMATED AVERAGE GLUCOSE: 97 MG/DL
GLUCOSE QUALITATIVE U: NEGATIVE MG/DL
GLUCOSE SERPL-MCNC: 93 MG/DL
HBA1C MFR BLD HPLC: 5 %
HCT VFR BLD CALC: 39.1 %
HDLC SERPL-MCNC: 45 MG/DL
HGB BLD-MCNC: 13 G/DL
KETONES URINE: NEGATIVE MG/DL
LDLC SERPL CALC-MCNC: 105 MG/DL
LEUKOCYTE ESTERASE URINE: ABNORMAL
MCHC RBC-ENTMCNC: 29.9 PG
MCHC RBC-ENTMCNC: 33.2 GM/DL
MCV RBC AUTO: 89.9 FL
MICROSCOPIC-UA: NORMAL
NITRITE URINE: NEGATIVE
NONHDLC SERPL-MCNC: 116 MG/DL
PH URINE: 6.5
PLATELET # BLD AUTO: 235 K/UL
POTASSIUM SERPL-SCNC: 4.2 MMOL/L
PROT SERPL-MCNC: 6.6 G/DL
PROTEIN URINE: NEGATIVE MG/DL
RBC # BLD: 4.35 M/UL
RBC # FLD: 11.9 %
RED BLOOD CELLS URINE: 4 /HPF
SODIUM SERPL-SCNC: 138 MMOL/L
SPECIFIC GRAVITY URINE: 1.02
TRIGL SERPL-MCNC: 54 MG/DL
TSH SERPL-ACNC: 1.18 UIU/ML
UROBILINOGEN URINE: 0.2 MG/DL
VIT B12 SERPL-MCNC: 818 PG/ML
WBC # FLD AUTO: 4.91 K/UL
WHITE BLOOD CELLS URINE: 12 /HPF

## 2024-03-05 ENCOUNTER — TRANSCRIPTION ENCOUNTER (OUTPATIENT)
Age: 34
End: 2024-03-05

## 2024-03-05 DIAGNOSIS — Z15.89 GENETIC SUSCEPTIBILITY TO OTHER DISEASE: ICD-10-CM

## 2024-03-05 LAB — HLX MTHFR FINAL REPORT: NORMAL

## 2024-03-13 ENCOUNTER — APPOINTMENT (OUTPATIENT)
Dept: INTERNAL MEDICINE | Facility: CLINIC | Age: 34
End: 2024-03-13

## 2024-04-16 ENCOUNTER — APPOINTMENT (OUTPATIENT)
Dept: INTERNAL MEDICINE | Facility: CLINIC | Age: 34
End: 2024-04-16
Payer: COMMERCIAL

## 2024-04-16 VITALS
OXYGEN SATURATION: 100 % | BODY MASS INDEX: 28 KG/M2 | RESPIRATION RATE: 14 BRPM | TEMPERATURE: 98.4 F | HEIGHT: 63 IN | SYSTOLIC BLOOD PRESSURE: 106 MMHG | DIASTOLIC BLOOD PRESSURE: 71 MMHG | WEIGHT: 158 LBS | HEART RATE: 74 BPM

## 2024-04-16 DIAGNOSIS — R10.32 LEFT LOWER QUADRANT PAIN: ICD-10-CM

## 2024-04-16 PROCEDURE — 99214 OFFICE O/P EST MOD 30 MIN: CPT

## 2024-04-16 NOTE — PHYSICAL EXAM
[Normal] : normal rate, regular rhythm, normal S1 and S2 and no murmur heard [No Edema] : there was no peripheral edema [Soft] : abdomen soft [Non-distended] : non-distended [No Masses] : no abdominal mass palpated [Normal Bowel Sounds] : normal bowel sounds [No CVA Tenderness] : no CVA  tenderness [No Rash] : no rash [Normal Gait] : normal gait [Normal Affect] : the affect was normal [Normal Mood] : the mood was normal [de-identified] : +LLQ tenderness

## 2024-04-16 NOTE — HISTORY OF PRESENT ILLNESS
[FreeTextEntry8] : Patient comes for an acute visit.   She is here for evaluation of left lower abdominal pain x 1 week. Pain is persistent.  She has constipation. No diarrhea. No blood in stool.  No fever, chills, or N/V. Appetite is good. No UTI sx. No urinary frequency, dysuria, or hematuria.

## 2024-04-16 NOTE — PLAN
[FreeTextEntry1] : Acute LLQ pain x 1 week. Send for CT A/P r/o diverticulitis vs renal stone.  Check UA and urine culture. Notify pt of results when available.

## 2024-04-16 NOTE — REVIEW OF SYSTEMS
[Abdominal Pain] : abdominal pain [Nausea] : no nausea [Constipation] : constipation [Diarrhea] : diarrhea [Vomiting] : no vomiting [Heartburn] : no heartburn [Melena] : no melena [Dysuria] : no dysuria [Hematuria] : no hematuria [Frequency] : no frequency [Negative] : Heme/Lymph

## 2024-04-17 ENCOUNTER — APPOINTMENT (OUTPATIENT)
Dept: CT IMAGING | Facility: CLINIC | Age: 34
End: 2024-04-17
Payer: COMMERCIAL

## 2024-04-17 PROCEDURE — 74177 CT ABD & PELVIS W/CONTRAST: CPT

## 2024-04-20 ENCOUNTER — TRANSCRIPTION ENCOUNTER (OUTPATIENT)
Age: 34
End: 2024-04-20

## 2024-04-21 LAB
APPEARANCE: CLEAR
BACTERIA UR CULT: NORMAL
BACTERIA: NEGATIVE /HPF
BILIRUBIN URINE: NEGATIVE
BLOOD URINE: NEGATIVE
CAST: 0 /LPF
COLOR: YELLOW
EPITHELIAL CELLS: 4 /HPF
GLUCOSE QUALITATIVE U: NEGATIVE MG/DL
KETONES URINE: 40 MG/DL
LEUKOCYTE ESTERASE URINE: ABNORMAL
MICROSCOPIC-UA: NORMAL
NITRITE URINE: NEGATIVE
PH URINE: 6.5
PROTEIN URINE: NEGATIVE MG/DL
RED BLOOD CELLS URINE: 2 /HPF
SPECIFIC GRAVITY URINE: 1.02
UROBILINOGEN URINE: 0.2 MG/DL
WHITE BLOOD CELLS URINE: 1 /HPF

## 2024-07-13 NOTE — DISCHARGE NOTE OB - CARE PROVIDER_API CALL
Abdomen soft, non-tender, no guarding. R flank tender.
Oriana Barclay)  Obstetrics and Gynecology  [address], [address]  [place] [id number]  Phone: [phone]  Fax: [phone]  Follow Up Time:

## 2024-08-16 NOTE — PROGRESS NOTE ADULT - SUBJECTIVE AND OBJECTIVE BOX
Day 1 of Anesthesia Pain Management Service    SUBJECTIVE:  Pain Scale Score:          [X] Refer to charted pain scores    THERAPY:    s/p neuraxial PF morphine    MEDICATIONS  (STANDING):  acetaminophen     Tablet .. 975 milliGRAM(s) Oral <User Schedule>  diphtheria/tetanus/pertussis (acellular) Vaccine (ADAcel) 0.5 milliLiter(s) IntraMuscular once  heparin   Injectable 5000 Unit(s) SubCutaneous every 12 hours  ibuprofen  Tablet. 600 milliGRAM(s) Oral every 6 hours  ketorolac   Injectable 30 milliGRAM(s) IV Push every 6 hours  lactated ringers. 1000 milliLiter(s) (125 mL/Hr) IV Continuous <Continuous>  morphine PF Epidural 2 milliGRAM(s) Epidural once  oxytocin Infusion 333.333 milliUNIT(s)/Min (1000 mL/Hr) IV Continuous <Continuous>    MEDICATIONS  (PRN):  dexAMETHasone  Injectable 4 milliGRAM(s) IV Push every 6 hours PRN Nausea  diphenhydrAMINE 25 milliGRAM(s) Oral every 6 hours PRN Pruritus  lanolin Ointment 1 Application(s) Topical every 6 hours PRN Sore Nipples  magnesium hydroxide Suspension 30 milliLiter(s) Oral two times a day PRN Constipation  naloxone Injectable 0.1 milliGRAM(s) IV Push every 3 minutes PRN For ANY of the following changes in patient status:  A. Breaths Per Minute LESS THAN 10, B. Oxygen saturation LESS THAN 90%, C. Sedation score of 6 for Stop After: 4 Times  ondansetron Injectable 4 milliGRAM(s) IV Push every 6 hours PRN Nausea  oxyCODONE    IR 5 milliGRAM(s) Oral every 3 hours PRN Mild Pain (1 - 3)  oxyCODONE    IR 10 milliGRAM(s) Oral every 3 hours PRN Moderate Pain (4 - 6)  oxyCODONE    IR 5 milliGRAM(s) Oral every 3 hours PRN Moderate to Severe Pain (4-10)  oxyCODONE    IR 5 milliGRAM(s) Oral once PRN Moderate to Severe Pain (4-10)  simethicone 80 milliGRAM(s) Chew every 4 hours PRN Gas      OBJECTIVE:    Sedation:        	[X] Alert	[ ] Drowsy	[ ] Arousable      [ ] Asleep       [ ] Unresponsive    Side Effects:	[X] None	[ ] Nausea	[ ] Vomiting         [ ] Pruritus  		[ ] Weakness            [ ] Numbness	          [ ] Other:    Vital Signs Last 24 Hrs  T(C): 36.9 (10 Jul 2022 06:07), Max: 37.3 (09 Jul 2022 21:49)  T(F): 98.4 (10 Jul 2022 06:07), Max: 99.1 (09 Jul 2022 21:49)  HR: 75 (10 Jul 2022 06:07) (75 - 96)  BP: 106/74 (10 Jul 2022 06:07) (106/74 - 142/58)  BP(mean): 86 (09 Jul 2022 12:40) (79 - 89)  RR: 18 (10 Jul 2022 06:07) (17 - 22)  SpO2: 96% (10 Jul 2022 06:07) (94% - 97%)    Parameters below as of 10 Jul 2022 06:07  Patient On (Oxygen Delivery Method): room air        ASSESSMENT/ PLAN  [X] Patient transitioned to prn analgesics  [X] Pain management per primary service, pain service to sign off   [X]Documentation and Verification of current medications
OB Attending Note      S: Pt feeling well, +F, pain controlled    Physical exam:    Vital Signs Last 24 Hrs  T(C): 36.9 (10 Jul 2022 06:07), Max: 37.3 (2022 21:49)  T(F): 98.4 (10 Jul 2022 06:07), Max: 99.1 (2022 21:49)  HR: 75 (10 Jul 2022 06:07) (75 - 96)  BP: 106/74 (10 Jul 2022 06:07) (106/74 - 142/58)  BP(mean): 86 (2022 12:40) (79 - 89)  RR: 18 (10 Jul 2022 06:07) (17 - 22)  SpO2: 96% (10 Jul 2022 06:07) (94% - 97%)        I&O's Summary    2022 07:01  -  10 Jul 2022 07:00  --------------------------------------------------------  IN: 2050 mL / OUT: 4952 mL / NET: -2902 mL        Gen: NAD  Breast: Soft, nontender, not engorged.  Abdomen: Soft, nontender, no distension , firm uterine fundus at umbilicus.  Incision: Clean, dry, and intact with tape  Scant Lochia  Ext: No calf tenderness    LABS:                        8.5    11.79 )-----------( 153      ( 10 Jul 2022 06:35 )             26.2                         10.4   10.76 )-----------( 204      ( 2022 22:05 )             31.4       22 @ 22:05      138  |  104  |  10  ----------------------------<  81  3.3<L>   |  20<L>  |  0.51        Ca    9.4      2022 22:05    TPro  6.2  /  Alb  3.6  /  TBili  0.1<L>  /  DBili  x   /  AST  21  /  ALT  15  /  AlkPhos  151<H>  22 @ 22:05              Assessment and Plan  POD #1 s/p  section  Doing well.  Continue Ambulation/OOB/Venodynes/heparin  Cont Pain medications  Regular diet  Circ counseling done, risks and benefits reviewed, Risks include not limited to bleeding, infection, organ damage, permanent  cosmetic changes and need for repeat circ  h/h not c/w QBL - more consistent with EBL            
Postpartum Note,  Section   ATTENDING NOTE Post-operative day 2    Subjective:  The patient feels well.  She is ambulating.   She is tolerating regular diet.  She denies nausea and vomiting.  She is voiding.  Her pain is controlled.  She reports normal postpartum bleeding    Physical exam:    Vital Signs Last 24 Hrs  T(C): 36.7 (2022 05:19), Max: 36.9 (10 Jul 2022 21:40)  T(F): 98 (2022 05:19), Max: 98.5 (10 Jul 2022 21:40)  HR: 76 (2022 05:19) (76 - 92)  BP: 121/78 (2022 05:19) (106/73 - 134/85)  BP(mean): --  RR: 18 (2022 05:19) (18 - 18)  SpO2: 96% (:19) (94% - 97%)    Parameters below as of 2022 05:19  Patient On (Oxygen Delivery Method): room air        Gen: NAD  Breast: Soft, nontender, not engorged.  Abdomen: Soft, nontender, no distension , firm uterine fundus at umbilicus.  Incision: Clean, dry, and intact  Pelvic: Normal lochia noted  Ext: No calf tenderness    LABS:                        8.5    11.79 )-----------( 153      ( 10 Jul 2022 06:35 )             26.2     ABO Interpretation: O (22 @ 00:11)  Rh Interpretation: Positive (22 @ 00:11)  ABO Interpretation: O (22 @ 00:11)  Rh Interpretation: Positive (22 @ 00:11)    Antibody ScreenNegative    22 @ 22:05      138  |  104  |  10  ----------------------------<  81  3.3<L>   |  20<L>  |  0.51        Ca    9.4      2022 22:05    TPro  6.2  /  Alb  3.6  /  TBili  0.1<L>  /  DBili  x   /  AST  21  /  ALT  15  /  AlkPhos  151<H>  22 @ 22:05        Allergies    No Known Allergies    Intolerances      MEDICATIONS  (STANDING):  acetaminophen     Tablet .. 975 milliGRAM(s) Oral <User Schedule>  diphtheria/tetanus/pertussis (acellular) Vaccine (ADAcel) 0.5 milliLiter(s) IntraMuscular once  heparin   Injectable 5000 Unit(s) SubCutaneous every 12 hours  ibuprofen  Tablet. 600 milliGRAM(s) Oral every 6 hours  lactated ringers. 1000 milliLiter(s) (125 mL/Hr) IV Continuous <Continuous>  oxytocin Infusion 333.333 milliUNIT(s)/Min (1000 mL/Hr) IV Continuous <Continuous>    MEDICATIONS  (PRN):  dexAMETHasone  Injectable 4 milliGRAM(s) IV Push every 6 hours PRN Nausea  diphenhydrAMINE 25 milliGRAM(s) Oral every 6 hours PRN Pruritus  lanolin Ointment 1 Application(s) Topical every 6 hours PRN Sore Nipples  magnesium hydroxide Suspension 30 milliLiter(s) Oral two times a day PRN Constipation  naloxone Injectable 0.1 milliGRAM(s) IV Push every 3 minutes PRN For ANY of the following changes in patient status:  A. Breaths Per Minute LESS THAN 10, B. Oxygen saturation LESS THAN 90%, C. Sedation score of 6 for Stop After: 4 Times  ondansetron Injectable 4 milliGRAM(s) IV Push every 6 hours PRN Nausea  oxyCODONE    IR 5 milliGRAM(s) Oral once PRN Moderate to Severe Pain (4-10)  oxyCODONE    IR 5 milliGRAM(s) Oral every 3 hours PRN Moderate to Severe Pain (4-10)  simethicone 80 milliGRAM(s) Chew every 4 hours PRN Gas        Assessment and Plan  POD # 2  s/p  section. Stable.  Encourage ambulation  Analgesia prn  Regular diet as tolerated      Office [phone]  Dr. Schaefer                
OB Progress Note: LTCS, POD#2    S: 32yo POD#2 s/p LTCS. Pain was not well controlled this morning, having some incisional pain. She is tolerating a regular diet and passing flatus. She is voiding spontaneously, and ambulating without difficulty. Denies CP/SOB. Denies lightheadedness/dizziness. Denies N/V.    O:  Vitals:  Vital Signs Last 24 Hrs  T(C): 36.7 (11 Jul 2022 05:19), Max: 36.9 (10 Jul 2022 06:07)  T(F): 98 (11 Jul 2022 05:19), Max: 98.5 (10 Jul 2022 21:40)  HR: 76 (11 Jul 2022 05:19) (75 - 92)  BP: 121/78 (11 Jul 2022 05:19) (106/73 - 134/85)  BP(mean): --  RR: 18 (11 Jul 2022 05:19) (18 - 18)  SpO2: 96% (11 Jul 2022 05:19) (94% - 97%)    Parameters below as of 11 Jul 2022 05:19  Patient On (Oxygen Delivery Method): room air        MEDICATIONS  (STANDING):  acetaminophen     Tablet .. 975 milliGRAM(s) Oral <User Schedule>  diphtheria/tetanus/pertussis (acellular) Vaccine (ADAcel) 0.5 milliLiter(s) IntraMuscular once  heparin   Injectable 5000 Unit(s) SubCutaneous every 12 hours  ibuprofen  Tablet. 600 milliGRAM(s) Oral every 6 hours  lactated ringers. 1000 milliLiter(s) (125 mL/Hr) IV Continuous <Continuous>  oxytocin Infusion 333.333 milliUNIT(s)/Min (1000 mL/Hr) IV Continuous <Continuous>      MEDICATIONS  (PRN):  dexAMETHasone  Injectable 4 milliGRAM(s) IV Push every 6 hours PRN Nausea  diphenhydrAMINE 25 milliGRAM(s) Oral every 6 hours PRN Pruritus  lanolin Ointment 1 Application(s) Topical every 6 hours PRN Sore Nipples  magnesium hydroxide Suspension 30 milliLiter(s) Oral two times a day PRN Constipation  naloxone Injectable 0.1 milliGRAM(s) IV Push every 3 minutes PRN For ANY of the following changes in patient status:  A. Breaths Per Minute LESS THAN 10, B. Oxygen saturation LESS THAN 90%, C. Sedation score of 6 for Stop After: 4 Times  ondansetron Injectable 4 milliGRAM(s) IV Push every 6 hours PRN Nausea  oxyCODONE    IR 5 milliGRAM(s) Oral once PRN Moderate to Severe Pain (4-10)  oxyCODONE    IR 5 milliGRAM(s) Oral every 3 hours PRN Moderate to Severe Pain (4-10)  simethicone 80 milliGRAM(s) Chew every 4 hours PRN Gas      Labs:  Blood type: O Positive  Rubella IgG: RPR: Negative                          8.5<L>   11.79<H> >-----------< 153    ( 07-10 @ 06:35 )             26.2<L>                        10.4<L>   10.76<H> >-----------< 204    ( 07-08 @ 22:05 )             31.4<L>    07-08-22 @ 22:05      138  |  104  |  10  ----------------------------<  81  3.3<L>   |  20<L>  |  0.51        Ca    9.4      08 Jul 2022 22:05    TPro  6.2  /  Alb  3.6  /  TBili  0.1<L>  /  DBili  x   /  AST  21  /  ALT  15  /  AlkPhos  151<H>  07-08-22 @ 22:05          PE:  General: NAD  Abdomen: Soft, appropriately tender, incision c/d/i.  Extremities: No erythema, mild leg edema BL.    
OB Progress Note:  Delivery, POD#1    S: 30yo  POD#1 s/p pLTCS for Cat III tracing. Her pain is well controlled. She is tolerating a regular diet and passing flatus. Denies N/V. Denies CP/SOB/lightheadedness/dizziness.   She is ambulating without difficulty.   Voiding spontaneously.     O:   Vital Signs Last 24 Hrs  T(C): 36.9 (10 Jul 2022 06:07), Max: 37.3 (2022 06:55)  T(F): 98.4 (10 Jul 2022 06:07), Max: 99.14 (2022 06:55)  HR: 75 (10 Jul 2022 06:07) (75 - 118)  BP: 106/74 (10 Jul 2022 06:07) (106/74 - 142/58)  BP(mean): 86 (2022 12:40) (79 - 89)  RR: 18 (10 Jul 2022 06:07) (17 - 22)  SpO2: 96% (10 Jul 2022 06:07) (87% - 100%)    Parameters below as of 10 Jul 2022 06:07  Patient On (Oxygen Delivery Method): room air      Labs:  Blood type: O Positive  Rubella IgG: RPR: Negative                          10.4<L>   10.76<H> >-----------< 204    (  @ 22:05 )             31.4<L>    22 @ 22:05      138  |  104  |  10  ----------------------------<  81  3.3<L>   |  20<L>  |  0.51        Ca    9.4      2022 22:05    TPro  6.2  /  Alb  3.6  /  TBili  0.1<L>  /  DBili  x   /  AST  21  /  ALT  15  /  AlkPhos  151<H>  22 @ 22:05    PE:  General: NAD  Abdomen: Mildly distended, appropriately tender, incision c/d/i with dermabond prineo in place.  Extremities: No erythema, no pitting edema    
other

## 2024-10-07 ENCOUNTER — APPOINTMENT (OUTPATIENT)
Dept: ULTRASOUND IMAGING | Facility: CLINIC | Age: 34
End: 2024-10-07
Payer: COMMERCIAL

## 2024-10-07 PROCEDURE — 76775 US EXAM ABDO BACK WALL LIM: CPT

## 2024-12-16 ENCOUNTER — RESULT REVIEW (OUTPATIENT)
Age: 34
End: 2024-12-16

## 2025-03-31 NOTE — OB RN TRIAGE NOTE - AS TEMP SITE
Patient Education        Noninsulin Medicines for Type 2 Diabetes: Care Instructions  Overview     There are different types of noninsulin medicines for diabetes. Each works in a different way. But they all help you control your blood sugar. Some types help your body make insulin to lower your blood sugar. Others lower how much insulin your body needs. Some can slow how fast your body digests sugars. And some can remove extra glucose through your urine.  You may need to take more than one medicine for diabetes. Two or more medicines may work better to lower your blood sugar level than just one does.  Metformin. This lowers how much glucose your liver makes. And it helps you respond better to insulin. It also lowers the amount of stored sugar that your liver releases when you are not eating.  Sulfonylureas. These help your body release more insulin. Some work for many hours. They can cause low blood sugar if you don't eat as you planned. An example is glipizide.  Thiazolidinediones. These reduce the amount of blood glucose. They also help you respond better to insulin. An example is pioglitazone.  SGLT2 inhibitors. These help to remove extra glucose through your urine. They may also help some people lose weight. An example is ertugliflozin.  DPP-4 inhibitors. These help your body raise the level of insulin after you eat. They also help your body make less of a hormone that raises blood sugar. An example is alogliptin.  GLP-1 receptor agonists. These help your body make a protein that can raise your insulin level and make you less hungry. They're given as shots or pills. An example is semaglutide.  Meglitinides. These help your body release insulin. They also help slow how your body digests sugars. So they can keep your blood sugar from rising too fast after you eat.  Alpha-glucosidase inhibitors. These keep starches from breaking down. This means that they lower the amount of glucose absorbed when you eat. They don't 
oral

## (undated) DEVICE — VACUUM CURETTE BERKLEY OLYMPUS F TIP 6MM

## (undated) DEVICE — WARMING BLANKET UPPER ADULT

## (undated) DEVICE — VACUUM CURETTE MEDGYN CURVED 9MM

## (undated) DEVICE — SOL IRR POUR NS 0.9% 500ML

## (undated) DEVICE — TUBING UTERINE ASPIRATION 3/8" X 6FT W/O ADAPTER

## (undated) DEVICE — VACUUM CURETTE BERKLEY OLYMPUS CURVED 9MM

## (undated) DEVICE — VACUUM CURETTE BUSSE HOSP CURVED 11MM

## (undated) DEVICE — POSITIONER PATIENT SAFETY STRAP 3X60"

## (undated) DEVICE — VACUUM CURETTE BUSSE HOSP CURVED 12MM

## (undated) DEVICE — PACK LITHOTOMY

## (undated) DEVICE — GLV 6 PROTEXIS (WHITE)

## (undated) DEVICE — VENODYNE/SCD SLEEVE CALF MEDIUM

## (undated) DEVICE — VACUUM CURETTE BERKLEY OLYMPUS CURVED 11MM

## (undated) DEVICE — VACUUM CURETTE BUSSE HOSP CURVED 10MM

## (undated) DEVICE — VACUUM CURETTE BERKLEY OLYMPUS CURVED 8MM

## (undated) DEVICE — VACUUM CURETTE BUSSE HOSP CURVED 14MM

## (undated) DEVICE — VACUUM CURETTE BUSSE HOSP STRAIGHT 7MM

## (undated) DEVICE — VACUUM CURETTE MEDGYN CURVED 13MM

## (undated) DEVICE — DRAPE LIGHT HANDLE COVER (GREEN)

## (undated) DEVICE — SOCK SPECIMEN 3/8"-1/2" MALE PORT

## (undated) DEVICE — VACUUM CURETTE BERKLEY OLYMPUS CURVED 16MM X 1/2"

## (undated) DEVICE — VACUUM CURETTE BUSSE HOSP CURVED 9MM

## (undated) DEVICE — VACUUM CURETTE BERKLEY OLYMPUS CURVED 12MM

## (undated) DEVICE — GLV 6.5 PROTEXIS (WHITE)

## (undated) DEVICE — VACUUM CURETTE BERKLEY OLYMPUS CURVED 7MM